# Patient Record
Sex: MALE | Race: WHITE | NOT HISPANIC OR LATINO | Employment: FULL TIME | URBAN - METROPOLITAN AREA
[De-identification: names, ages, dates, MRNs, and addresses within clinical notes are randomized per-mention and may not be internally consistent; named-entity substitution may affect disease eponyms.]

---

## 2018-10-11 ENCOUNTER — HOSPITAL ENCOUNTER (OUTPATIENT)
Dept: RADIOLOGY | Facility: MEDICAL CENTER | Age: 28
End: 2018-10-11
Attending: COLON & RECTAL SURGERY
Payer: COMMERCIAL

## 2018-10-11 DIAGNOSIS — Z01.818 OTHER SPECIFIED PRE-OPERATIVE EXAMINATION: ICD-10-CM

## 2018-10-11 DIAGNOSIS — E66.01 MORBID OBESITY (HCC): ICD-10-CM

## 2018-10-11 PROCEDURE — 700112 HCHG RX REV CODE 229: Performed by: COLON & RECTAL SURGERY

## 2018-10-11 PROCEDURE — A9270 NON-COVERED ITEM OR SERVICE: HCPCS | Performed by: COLON & RECTAL SURGERY

## 2018-10-11 PROCEDURE — 74241 DX-UPPER GI-SERIES WITH KUB: CPT

## 2018-10-11 RX ADMIN — ANTACID/ANTIFLATULENT 1 PACKET: 380; 550; 10; 10 GRANULE, EFFERVESCENT ORAL at 12:24

## 2018-11-12 ENCOUNTER — HOSPITAL ENCOUNTER (OUTPATIENT)
Dept: RADIOLOGY | Facility: MEDICAL CENTER | Age: 28
End: 2018-11-12
Attending: COLON & RECTAL SURGERY | Admitting: COLON & RECTAL SURGERY
Payer: COMMERCIAL

## 2018-11-12 DIAGNOSIS — Z01.812 PRE-OPERATIVE LABORATORY EXAMINATION: ICD-10-CM

## 2018-11-12 DIAGNOSIS — Z01.811 PRE-OPERATIVE RESPIRATORY EXAMINATION: ICD-10-CM

## 2018-11-12 DIAGNOSIS — Z01.810 PRE-OPERATIVE CARDIOVASCULAR EXAMINATION: ICD-10-CM

## 2018-11-12 LAB
ALBUMIN SERPL BCP-MCNC: 4.8 G/DL (ref 3.2–4.9)
ALBUMIN/GLOB SERPL: 1.5 G/DL
ALP SERPL-CCNC: 83 U/L (ref 30–99)
ALT SERPL-CCNC: 36 U/L (ref 2–50)
ANION GAP SERPL CALC-SCNC: 13 MMOL/L (ref 0–11.9)
AST SERPL-CCNC: 22 U/L (ref 12–45)
BASOPHILS # BLD AUTO: 0.4 % (ref 0–1.8)
BASOPHILS # BLD: 0.03 K/UL (ref 0–0.12)
BILIRUB SERPL-MCNC: 0.6 MG/DL (ref 0.1–1.5)
BUN SERPL-MCNC: 9 MG/DL (ref 8–22)
CALCIUM SERPL-MCNC: 10.5 MG/DL (ref 8.5–10.5)
CHLORIDE SERPL-SCNC: 102 MMOL/L (ref 96–112)
CO2 SERPL-SCNC: 25 MMOL/L (ref 20–33)
CREAT SERPL-MCNC: 0.75 MG/DL (ref 0.5–1.4)
EKG IMPRESSION: NORMAL
EOSINOPHIL # BLD AUTO: 0.12 K/UL (ref 0–0.51)
EOSINOPHIL NFR BLD: 1.5 % (ref 0–6.9)
ERYTHROCYTE [DISTWIDTH] IN BLOOD BY AUTOMATED COUNT: 41.5 FL (ref 35.9–50)
EST. AVERAGE GLUCOSE BLD GHB EST-MCNC: 140 MG/DL
GLOBULIN SER CALC-MCNC: 3.2 G/DL (ref 1.9–3.5)
GLUCOSE SERPL-MCNC: 95 MG/DL (ref 65–99)
HBA1C MFR BLD: 6.5 % (ref 0–5.6)
HCT VFR BLD AUTO: 44.8 % (ref 42–52)
HGB BLD-MCNC: 14.6 G/DL (ref 14–18)
IMM GRANULOCYTES # BLD AUTO: 0.08 K/UL (ref 0–0.11)
IMM GRANULOCYTES NFR BLD AUTO: 1 % (ref 0–0.9)
INR PPP: 1.02 (ref 0.87–1.13)
LYMPHOCYTES # BLD AUTO: 2.14 K/UL (ref 1–4.8)
LYMPHOCYTES NFR BLD: 26.1 % (ref 22–41)
MCH RBC QN AUTO: 26 PG (ref 27–33)
MCHC RBC AUTO-ENTMCNC: 32.6 G/DL (ref 33.7–35.3)
MCV RBC AUTO: 79.9 FL (ref 81.4–97.8)
MONOCYTES # BLD AUTO: 0.5 K/UL (ref 0–0.85)
MONOCYTES NFR BLD AUTO: 6.1 % (ref 0–13.4)
NEUTROPHILS # BLD AUTO: 5.34 K/UL (ref 1.82–7.42)
NEUTROPHILS NFR BLD: 64.9 % (ref 44–72)
NRBC # BLD AUTO: 0 K/UL
NRBC BLD-RTO: 0 /100 WBC
PLATELET # BLD AUTO: 230 K/UL (ref 164–446)
PMV BLD AUTO: 12 FL (ref 9–12.9)
POTASSIUM SERPL-SCNC: 3.9 MMOL/L (ref 3.6–5.5)
PROT SERPL-MCNC: 8 G/DL (ref 6–8.2)
PROTHROMBIN TIME: 13.5 SEC (ref 12–14.6)
RBC # BLD AUTO: 5.61 M/UL (ref 4.7–6.1)
SODIUM SERPL-SCNC: 140 MMOL/L (ref 135–145)
WBC # BLD AUTO: 8.2 K/UL (ref 4.8–10.8)

## 2018-11-12 PROCEDURE — 85610 PROTHROMBIN TIME: CPT

## 2018-11-12 PROCEDURE — 71045 X-RAY EXAM CHEST 1 VIEW: CPT

## 2018-11-12 PROCEDURE — 93010 ELECTROCARDIOGRAM REPORT: CPT | Performed by: INTERNAL MEDICINE

## 2018-11-12 PROCEDURE — 36415 COLL VENOUS BLD VENIPUNCTURE: CPT

## 2018-11-12 PROCEDURE — 83036 HEMOGLOBIN GLYCOSYLATED A1C: CPT

## 2018-11-12 PROCEDURE — 85025 COMPLETE CBC W/AUTO DIFF WBC: CPT

## 2018-11-12 PROCEDURE — 93005 ELECTROCARDIOGRAM TRACING: CPT

## 2018-11-12 PROCEDURE — 80053 COMPREHEN METABOLIC PANEL: CPT

## 2018-11-12 RX ORDER — GLYBURIDE 5 MG/1
10 TABLET ORAL 2 TIMES DAILY
COMMUNITY

## 2018-11-12 RX ORDER — AMLODIPINE BESYLATE 2.5 MG/1
2.5 TABLET ORAL EVERY EVENING
COMMUNITY

## 2018-11-12 RX ORDER — SERTRALINE HYDROCHLORIDE 100 MG/1
200 TABLET, FILM COATED ORAL
COMMUNITY

## 2018-11-19 RX ORDER — ACETAMINOPHEN 10 MG/ML
1 INJECTION, SOLUTION INTRAVENOUS
Status: COMPLETED | OUTPATIENT
Start: 2018-11-21 | End: 2018-11-21

## 2018-11-21 ENCOUNTER — HOSPITAL ENCOUNTER (OUTPATIENT)
Facility: MEDICAL CENTER | Age: 28
End: 2018-11-22
Attending: COLON & RECTAL SURGERY | Admitting: COLON & RECTAL SURGERY
Payer: COMMERCIAL

## 2018-11-21 LAB
GLUCOSE BLD-MCNC: 114 MG/DL (ref 65–99)
PATHOLOGY CONSULT NOTE: NORMAL

## 2018-11-21 PROCEDURE — 700111 HCHG RX REV CODE 636 W/ 250 OVERRIDE (IP): Performed by: COLON & RECTAL SURGERY

## 2018-11-21 PROCEDURE — 700102 HCHG RX REV CODE 250 W/ 637 OVERRIDE(OP): Performed by: PHYSICIAN ASSISTANT

## 2018-11-21 PROCEDURE — A9270 NON-COVERED ITEM OR SERVICE: HCPCS | Performed by: PHYSICIAN ASSISTANT

## 2018-11-21 PROCEDURE — 502570 HCHG PACK, GASTRIC BANDING: Performed by: COLON & RECTAL SURGERY

## 2018-11-21 PROCEDURE — 700101 HCHG RX REV CODE 250

## 2018-11-21 PROCEDURE — 160035 HCHG PACU - 1ST 60 MINS PHASE I: Performed by: COLON & RECTAL SURGERY

## 2018-11-21 PROCEDURE — 160036 HCHG PACU - EA ADDL 30 MINS PHASE I: Performed by: COLON & RECTAL SURGERY

## 2018-11-21 PROCEDURE — G0378 HOSPITAL OBSERVATION PER HR: HCPCS

## 2018-11-21 PROCEDURE — 700111 HCHG RX REV CODE 636 W/ 250 OVERRIDE (IP): Performed by: PHYSICIAN ASSISTANT

## 2018-11-21 PROCEDURE — 160041 HCHG SURGERY MINUTES - EA ADDL 1 MIN LEVEL 4: Performed by: COLON & RECTAL SURGERY

## 2018-11-21 PROCEDURE — 501583 HCHG TROCAR, THRD CAN&SEAL 5X100: Performed by: COLON & RECTAL SURGERY

## 2018-11-21 PROCEDURE — 82962 GLUCOSE BLOOD TEST: CPT

## 2018-11-21 PROCEDURE — 500448 HCHG DRESSING, TELFA 3X4: Performed by: COLON & RECTAL SURGERY

## 2018-11-21 PROCEDURE — 160002 HCHG RECOVERY MINUTES (STAT): Performed by: COLON & RECTAL SURGERY

## 2018-11-21 PROCEDURE — 88305 TISSUE EXAM BY PATHOLOGIST: CPT

## 2018-11-21 PROCEDURE — A9270 NON-COVERED ITEM OR SERVICE: HCPCS | Performed by: COLON & RECTAL SURGERY

## 2018-11-21 PROCEDURE — 501399 HCHG SPECIMAN BAG, ENDO CATC: Performed by: COLON & RECTAL SURGERY

## 2018-11-21 PROCEDURE — 501338 HCHG SHEARS, ENDO: Performed by: COLON & RECTAL SURGERY

## 2018-11-21 PROCEDURE — 700102 HCHG RX REV CODE 250 W/ 637 OVERRIDE(OP): Performed by: COLON & RECTAL SURGERY

## 2018-11-21 PROCEDURE — 501497 HCHG SURGICLIP: Performed by: COLON & RECTAL SURGERY

## 2018-11-21 PROCEDURE — 500800 HCHG LAPAROSCOPIC J/L HOOK: Performed by: COLON & RECTAL SURGERY

## 2018-11-21 PROCEDURE — 88307 TISSUE EXAM BY PATHOLOGIST: CPT

## 2018-11-21 PROCEDURE — 160029 HCHG SURGERY MINUTES - 1ST 30 MINS LEVEL 4: Performed by: COLON & RECTAL SURGERY

## 2018-11-21 PROCEDURE — 503366 HCHG TROCAR, 12X150 KII FIOS Z THR: Performed by: COLON & RECTAL SURGERY

## 2018-11-21 PROCEDURE — 160009 HCHG ANES TIME/MIN: Performed by: COLON & RECTAL SURGERY

## 2018-11-21 PROCEDURE — 501838 HCHG SUTURE GENERAL: Performed by: COLON & RECTAL SURGERY

## 2018-11-21 PROCEDURE — 88313 SPECIAL STAINS GROUP 2: CPT | Mod: 91

## 2018-11-21 PROCEDURE — 501570 HCHG TROCAR, SEPARATOR: Performed by: COLON & RECTAL SURGERY

## 2018-11-21 PROCEDURE — 700111 HCHG RX REV CODE 636 W/ 250 OVERRIDE (IP)

## 2018-11-21 PROCEDURE — 700111 HCHG RX REV CODE 636 W/ 250 OVERRIDE (IP): Performed by: ANESTHESIOLOGY

## 2018-11-21 PROCEDURE — 96375 TX/PRO/DX INJ NEW DRUG ADDON: CPT

## 2018-11-21 PROCEDURE — 160048 HCHG OR STATISTICAL LEVEL 1-5: Performed by: COLON & RECTAL SURGERY

## 2018-11-21 RX ORDER — SODIUM CHLORIDE, SODIUM LACTATE, POTASSIUM CHLORIDE, AND CALCIUM CHLORIDE .6; .31; .03; .02 G/100ML; G/100ML; G/100ML; G/100ML
500 INJECTION, SOLUTION INTRAVENOUS
Status: DISCONTINUED | OUTPATIENT
Start: 2018-11-21 | End: 2018-11-22 | Stop reason: HOSPADM

## 2018-11-21 RX ORDER — HYDROMORPHONE HYDROCHLORIDE 1 MG/ML
0.5 INJECTION, SOLUTION INTRAMUSCULAR; INTRAVENOUS; SUBCUTANEOUS
Status: DISCONTINUED | OUTPATIENT
Start: 2018-11-21 | End: 2018-11-22 | Stop reason: HOSPADM

## 2018-11-21 RX ORDER — BUPIVACAINE HYDROCHLORIDE AND EPINEPHRINE 5; 5 MG/ML; UG/ML
INJECTION, SOLUTION PERINEURAL
Status: DISCONTINUED | OUTPATIENT
Start: 2018-11-21 | End: 2018-11-21 | Stop reason: HOSPADM

## 2018-11-21 RX ORDER — DIPHENHYDRAMINE HYDROCHLORIDE 50 MG/ML
12.5 INJECTION INTRAMUSCULAR; INTRAVENOUS EVERY 6 HOURS PRN
Status: DISCONTINUED | OUTPATIENT
Start: 2018-11-21 | End: 2018-11-22 | Stop reason: HOSPADM

## 2018-11-21 RX ORDER — ONDANSETRON 2 MG/ML
4 INJECTION INTRAMUSCULAR; INTRAVENOUS
Status: COMPLETED | OUTPATIENT
Start: 2018-11-21 | End: 2018-11-21

## 2018-11-21 RX ORDER — GABAPENTIN 300 MG/1
300 CAPSULE ORAL 3 TIMES DAILY
Status: DISCONTINUED | OUTPATIENT
Start: 2018-11-21 | End: 2018-11-22 | Stop reason: HOSPADM

## 2018-11-21 RX ORDER — ACETAMINOPHEN 500 MG
1000 TABLET ORAL EVERY 6 HOURS
Status: DISCONTINUED | OUTPATIENT
Start: 2018-11-22 | End: 2018-11-22 | Stop reason: HOSPADM

## 2018-11-21 RX ORDER — ONDANSETRON 2 MG/ML
4 INJECTION INTRAMUSCULAR; INTRAVENOUS EVERY 4 HOURS PRN
Status: DISCONTINUED | OUTPATIENT
Start: 2018-11-21 | End: 2018-11-22 | Stop reason: HOSPADM

## 2018-11-21 RX ORDER — LABETALOL HYDROCHLORIDE 5 MG/ML
5 INJECTION, SOLUTION INTRAVENOUS
Status: DISCONTINUED | OUTPATIENT
Start: 2018-11-21 | End: 2018-11-21 | Stop reason: HOSPADM

## 2018-11-21 RX ORDER — SCOLOPAMINE TRANSDERMAL SYSTEM 1 MG/1
1 PATCH, EXTENDED RELEASE TRANSDERMAL
COMMUNITY
End: 2022-03-23

## 2018-11-21 RX ORDER — SODIUM CHLORIDE, SODIUM LACTATE, POTASSIUM CHLORIDE, CALCIUM CHLORIDE 600; 310; 30; 20 MG/100ML; MG/100ML; MG/100ML; MG/100ML
INJECTION, SOLUTION INTRAVENOUS CONTINUOUS
Status: DISCONTINUED | OUTPATIENT
Start: 2018-11-21 | End: 2018-11-21 | Stop reason: HOSPADM

## 2018-11-21 RX ORDER — GABAPENTIN 300 MG/1
300 CAPSULE ORAL
Status: COMPLETED | OUTPATIENT
Start: 2018-11-21 | End: 2018-11-21

## 2018-11-21 RX ORDER — DEXTROSE MONOHYDRATE 25 G/50ML
50 INJECTION, SOLUTION INTRAVENOUS PRN
Status: DISCONTINUED | OUTPATIENT
Start: 2018-11-21 | End: 2018-11-22 | Stop reason: HOSPADM

## 2018-11-21 RX ORDER — HALOPERIDOL 5 MG/ML
1 INJECTION INTRAMUSCULAR EVERY 6 HOURS PRN
Status: DISCONTINUED | OUTPATIENT
Start: 2018-11-21 | End: 2018-11-22 | Stop reason: HOSPADM

## 2018-11-21 RX ORDER — LIDOCAINE HYDROCHLORIDE 10 MG/ML
INJECTION, SOLUTION INFILTRATION; PERINEURAL
Status: COMPLETED
Start: 2018-11-21 | End: 2018-11-21

## 2018-11-21 RX ORDER — ENALAPRILAT 1.25 MG/ML
2.5 INJECTION INTRAVENOUS EVERY 6 HOURS PRN
Status: DISCONTINUED | OUTPATIENT
Start: 2018-11-21 | End: 2018-11-22 | Stop reason: HOSPADM

## 2018-11-21 RX ORDER — PROMETHAZINE HYDROCHLORIDE 25 MG/1
25 SUPPOSITORY RECTAL EVERY 4 HOURS PRN
Status: DISCONTINUED | OUTPATIENT
Start: 2018-11-21 | End: 2018-11-22 | Stop reason: HOSPADM

## 2018-11-21 RX ORDER — SODIUM CHLORIDE 9 MG/ML
INJECTION, SOLUTION INTRAVENOUS ONCE
Status: COMPLETED | OUTPATIENT
Start: 2018-11-21 | End: 2018-11-21

## 2018-11-21 RX ORDER — ACETAMINOPHEN 10 MG/ML
1000 INJECTION, SOLUTION INTRAVENOUS EVERY 6 HOURS
Status: DISPENSED | OUTPATIENT
Start: 2018-11-21 | End: 2018-11-22

## 2018-11-21 RX ORDER — OXYCODONE HCL 5 MG/5 ML
10 SOLUTION, ORAL ORAL
Status: DISCONTINUED | OUTPATIENT
Start: 2018-11-21 | End: 2018-11-22 | Stop reason: HOSPADM

## 2018-11-21 RX ORDER — AMLODIPINE BESYLATE 5 MG/1
2.5 TABLET ORAL EVERY MORNING
Status: DISCONTINUED | OUTPATIENT
Start: 2018-11-21 | End: 2018-11-22 | Stop reason: HOSPADM

## 2018-11-21 RX ORDER — MEPERIDINE HYDROCHLORIDE 25 MG/ML
25 INJECTION INTRAMUSCULAR; INTRAVENOUS; SUBCUTANEOUS
Status: DISCONTINUED | OUTPATIENT
Start: 2018-11-21 | End: 2018-11-21 | Stop reason: HOSPADM

## 2018-11-21 RX ORDER — HYDROXYZINE HYDROCHLORIDE 25 MG/1
25 TABLET, FILM COATED ORAL EVERY 6 HOURS PRN
Status: DISCONTINUED | OUTPATIENT
Start: 2018-11-21 | End: 2018-11-22 | Stop reason: HOSPADM

## 2018-11-21 RX ORDER — SERTRALINE HYDROCHLORIDE 100 MG/1
200 TABLET, FILM COATED ORAL
Status: DISCONTINUED | OUTPATIENT
Start: 2018-11-21 | End: 2018-11-22 | Stop reason: HOSPADM

## 2018-11-21 RX ORDER — SIMETHICONE 80 MG
80 TABLET,CHEWABLE ORAL 3 TIMES DAILY PRN
Status: DISCONTINUED | OUTPATIENT
Start: 2018-11-21 | End: 2018-11-22 | Stop reason: HOSPADM

## 2018-11-21 RX ORDER — MIDAZOLAM HYDROCHLORIDE 1 MG/ML
1 INJECTION INTRAMUSCULAR; INTRAVENOUS
Status: DISCONTINUED | OUTPATIENT
Start: 2018-11-21 | End: 2018-11-21 | Stop reason: HOSPADM

## 2018-11-21 RX ORDER — CALCIUM CARBONATE 500 MG/1
500 TABLET, CHEWABLE ORAL
Status: DISCONTINUED | OUTPATIENT
Start: 2018-11-21 | End: 2018-11-22 | Stop reason: HOSPADM

## 2018-11-21 RX ORDER — OXYCODONE HCL 10 MG/1
10 TABLET, FILM COATED, EXTENDED RELEASE ORAL
Status: COMPLETED | OUTPATIENT
Start: 2018-11-21 | End: 2018-11-21

## 2018-11-21 RX ADMIN — MIDAZOLAM 1 MG: 1 INJECTION INTRAMUSCULAR; INTRAVENOUS at 13:17

## 2018-11-21 RX ADMIN — OXYCODONE HYDROCHLORIDE 10 MG: 10 TABLET, FILM COATED, EXTENDED RELEASE ORAL at 10:34

## 2018-11-21 RX ADMIN — OXYCODONE HYDROCHLORIDE 10 MG: 5 SOLUTION ORAL at 15:28

## 2018-11-21 RX ADMIN — MIDAZOLAM 1 MG: 1 INJECTION INTRAMUSCULAR; INTRAVENOUS at 13:12

## 2018-11-21 RX ADMIN — OXYCODONE HYDROCHLORIDE 10 MG: 5 SOLUTION ORAL at 19:32

## 2018-11-21 RX ADMIN — SODIUM CHLORIDE: 9 INJECTION, SOLUTION INTRAVENOUS at 10:35

## 2018-11-21 RX ADMIN — GABAPENTIN 300 MG: 300 CAPSULE ORAL at 10:34

## 2018-11-21 RX ADMIN — ONDANSETRON 4 MG: 2 INJECTION INTRAMUSCULAR; INTRAVENOUS at 12:56

## 2018-11-21 RX ADMIN — GABAPENTIN 300 MG: 300 CAPSULE ORAL at 18:07

## 2018-11-21 RX ADMIN — ENALAPRILAT 1.25 MG: 1.25 INJECTION INTRAVENOUS at 15:29

## 2018-11-21 RX ADMIN — ACETAMINOPHEN 1 G: 10 INJECTION, SOLUTION INTRAVENOUS at 10:38

## 2018-11-21 RX ADMIN — FAMOTIDINE 20 MG: 10 INJECTION INTRAVENOUS at 18:07

## 2018-11-21 RX ADMIN — LIDOCAINE HYDROCHLORIDE 2 ML: 10 INJECTION, SOLUTION INFILTRATION; PERINEURAL at 10:35

## 2018-11-21 RX ADMIN — MEPERIDINE HYDROCHLORIDE 25 MG: 25 INJECTION INTRAMUSCULAR; INTRAVENOUS; SUBCUTANEOUS at 12:56

## 2018-11-21 ASSESSMENT — PATIENT HEALTH QUESTIONNAIRE - PHQ9
2. FEELING DOWN, DEPRESSED, IRRITABLE, OR HOPELESS: NOT AT ALL
1. LITTLE INTEREST OR PLEASURE IN DOING THINGS: NOT AT ALL
SUM OF ALL RESPONSES TO PHQ9 QUESTIONS 1 AND 2: 0

## 2018-11-21 ASSESSMENT — COGNITIVE AND FUNCTIONAL STATUS - GENERAL
DAILY ACTIVITIY SCORE: 24
SUGGESTED CMS G CODE MODIFIER MOBILITY: CJ
TURNING FROM BACK TO SIDE WHILE IN FLAT BAD: A LITTLE
MOBILITY SCORE: 22
SUGGESTED CMS G CODE MODIFIER DAILY ACTIVITY: CH
CLIMB 3 TO 5 STEPS WITH RAILING: A LITTLE

## 2018-11-21 ASSESSMENT — PAIN SCALES - GENERAL
PAINLEVEL_OUTOF10: 0
PAINLEVEL_OUTOF10: 0
PAINLEVEL_OUTOF10: 3
PAINLEVEL_OUTOF10: 0
PAINLEVEL_OUTOF10: 3
PAINLEVEL_OUTOF10: 0
PAINLEVEL_OUTOF10: 4
PAINLEVEL_OUTOF10: 6
PAINLEVEL_OUTOF10: 2
PAINLEVEL_OUTOF10: 4
PAINLEVEL_OUTOF10: 4

## 2018-11-21 ASSESSMENT — LIFESTYLE VARIABLES
ALCOHOL_USE: NO
EVER_SMOKED: NEVER

## 2018-11-21 NOTE — PROGRESS NOTES
Received pt from PACU. Parent at [facility]. Pt AO x4. No complaints of nausea, tolerable pain at incision site. Pain 4/10, ice applied, SCD on.

## 2018-11-21 NOTE — OP REPORT
NAME:  Ga Mora  MRN:  [id number]  :  1990      DATE OF OPERATION: 2018    PREOPERATIVE DIAGNOSIS: Morbid Obesity with medical sequelae; Congested Fatty Liver Disease    POSTOPERATIVE DIAGNOSIS: Morbid Obesity with medical sequelae; Congested Fatty Liver Disease    OPERATION PERFORMED: 1.  Laparoscopic Sleeve Gastrectomy  2.  Left Lobe Liver Biopsy     SURGEON: Mahin Hull MD    ASSISTANT:  Krzysztof Mancini PA-C    ANESTHESIOLOGIST:  Anesthesiologist: Jonathan Yao M.D.    ANESTHESIA: General endotracheal anesthesia.     SPECIMEN: Stomach; Liver Biopsy    ESTIMATED BLOOD LOSS: <10cc.     INDICATIONS: The patient is a 28 y.o. male with a diagnosis of morbid obesity with medical sequelae. He is taken to the operating room today for Laparoscopic Sleeve Gastrectomy and liver biopsy.     PROCEDURE: Following informed consent, the patient was properly identified, taken to the operating room, and placed in the supine position where general endotracheal anesthesia was administered. Intravenous antibiotics were administered by the anesthesiologist in the correct time interval. Sequential compression devices were employed. The abdomen was prepped and draped into a sterile field.     An optical entry bladeless  trocar was utilized and pneumoperitoneum carefully established in the usual fashion.  The bladeless 5 mm separator trocar was introduced and the 5 mm lens/camera was passed into the peritoneal cavity.  Three additional separator trocars were placed under direct vision.  A 5 mm Tiffany-type liver retractor was placed into position.  This was used to elevate the left sided segment of the liver.  It was secured to the patients right side with a robot arm.  Careful inspection revealed no untoward events with placement of the trocars.    The gastrocolic omentum was examined and dissected with the ligasure device and a point on the distal antrum was selected to begin the sleeve gastrectomy.  A 40 Mongolian  bougie was then passed down into the antrum.  A careful inspection at the hiatus demonstrated no significant hiatal hernia which would require repair or risk significant reflux. A long 12mm bladeless trocar was introduced. A TVPage linear stapler with a thick-tissue cartridges, was employed to divide partway across the stomach.  With the bougie in position, the stapler was then used to march proximally along the stomach and transsection of the stomach was performed, beginning 5 cm proximal to the pylorus in the method of the sleeve gastrectomy.  The greater curvature aspect of the stomach was then dissected and the greater curvature vessels and short gastric vessels were divided with the ligasure.      The last endomechanical stapler firings were used to complete the transection of the stomach.  Hemoclips were used if any site exhibited oozing. Careful inspection of the staple line demonstrated excellent, meticulous hemostasis and a completely intact staple line with seemless tissue approximation.  Seromuscular sutures were placed using polysorb suture to further secure the staple line.  The liver exhibited hepatic steatosis.  A Trucut liver biopsy was obtained from the left lobe of the liver and sent for pathology.  Hemostasis on the liver was achieved with cautery. The bougie was then removed.     The large endocatch bag was then used to retrieve the stomach specimen.  Tisseal fibrin glue sealant was sprayed along the entire staple line. The ports were removed under direct vision and the pneumoperitoneum was allowed to escape. The fascia of this port was closed with 0-Vicryl suture.  The port sites were then irrigated well.  The port site skin incisions were closed with interrupted 4-0 Vicryl subcuticular sutures.  Steri-Strips and Benzoin were applied beneath sterile Band-Aids.     The patient tolerated the procedure well and there were no apparent complications. All sponge, needle, and instrument counts were  correct on 2 separate occasions. He was awakened, extubated, and transferred to the recovery room in satisfactory condition.       ____________________________________   Mahin Hull MD  DD: 11/21/2018  12:34 PM    CC:  [facility];

## 2018-11-21 NOTE — PROGRESS NOTES
Med rec updated and complete  Allergies reviewed  Interviewed pt with mother at bedside with permission from pt.  Pt reports no vitamins or OTC's.  Pt reports no antibiotics in the last 30 days.

## 2018-11-21 NOTE — OR NURSING
VSS, pt AOx4, states pain as tolerable, no nausea. Surgical dressings x5 with scant drainage. On 2L O2Sat 95%. Report called, family updated.

## 2018-11-21 NOTE — OR NURSING
Pt to PACU, resting with eyes closed, VSS, on 4LNC, O2Sat 95%. Dressing intact,ice pack to the site, no apparent distress.

## 2018-11-22 VITALS
SYSTOLIC BLOOD PRESSURE: 143 MMHG | DIASTOLIC BLOOD PRESSURE: 76 MMHG | HEART RATE: 82 BPM | HEIGHT: 72 IN | TEMPERATURE: 98.5 F | OXYGEN SATURATION: 90 % | BODY MASS INDEX: 42.66 KG/M2 | WEIGHT: 315 LBS | RESPIRATION RATE: 16 BRPM

## 2018-11-22 LAB
ALBUMIN SERPL BCP-MCNC: 4.4 G/DL (ref 3.2–4.9)
ALBUMIN/GLOB SERPL: 1.8 G/DL
ALP SERPL-CCNC: 70 U/L (ref 30–99)
ALT SERPL-CCNC: 45 U/L (ref 2–50)
ANION GAP SERPL CALC-SCNC: 11 MMOL/L (ref 0–11.9)
AST SERPL-CCNC: 22 U/L (ref 12–45)
BILIRUB SERPL-MCNC: 0.4 MG/DL (ref 0.1–1.5)
BUN SERPL-MCNC: 6 MG/DL (ref 8–22)
CALCIUM SERPL-MCNC: 9.5 MG/DL (ref 8.5–10.5)
CHLORIDE SERPL-SCNC: 103 MMOL/L (ref 96–112)
CO2 SERPL-SCNC: 22 MMOL/L (ref 20–33)
CREAT SERPL-MCNC: 0.58 MG/DL (ref 0.5–1.4)
ERYTHROCYTE [DISTWIDTH] IN BLOOD BY AUTOMATED COUNT: 42.5 FL (ref 35.9–50)
GLOBULIN SER CALC-MCNC: 2.5 G/DL (ref 1.9–3.5)
GLUCOSE SERPL-MCNC: 118 MG/DL (ref 65–99)
HCT VFR BLD AUTO: 40.4 % (ref 42–52)
HGB BLD-MCNC: 13.1 G/DL (ref 14–18)
MCH RBC QN AUTO: 26 PG (ref 27–33)
MCHC RBC AUTO-ENTMCNC: 32.4 G/DL (ref 33.7–35.3)
MCV RBC AUTO: 80.3 FL (ref 81.4–97.8)
PLATELET # BLD AUTO: 237 K/UL (ref 164–446)
PMV BLD AUTO: 12.6 FL (ref 9–12.9)
POTASSIUM SERPL-SCNC: 3.8 MMOL/L (ref 3.6–5.5)
PROT SERPL-MCNC: 6.9 G/DL (ref 6–8.2)
RBC # BLD AUTO: 5.03 M/UL (ref 4.7–6.1)
SODIUM SERPL-SCNC: 136 MMOL/L (ref 135–145)
WBC # BLD AUTO: 12.3 K/UL (ref 4.8–10.8)

## 2018-11-22 PROCEDURE — G0378 HOSPITAL OBSERVATION PER HR: HCPCS

## 2018-11-22 PROCEDURE — 85027 COMPLETE CBC AUTOMATED: CPT

## 2018-11-22 PROCEDURE — 700102 HCHG RX REV CODE 250 W/ 637 OVERRIDE(OP): Performed by: PHYSICIAN ASSISTANT

## 2018-11-22 PROCEDURE — 90686 IIV4 VACC NO PRSV 0.5 ML IM: CPT | Performed by: COLON & RECTAL SURGERY

## 2018-11-22 PROCEDURE — 90471 IMMUNIZATION ADMIN: CPT

## 2018-11-22 PROCEDURE — 700111 HCHG RX REV CODE 636 W/ 250 OVERRIDE (IP): Performed by: PHYSICIAN ASSISTANT

## 2018-11-22 PROCEDURE — 80053 COMPREHEN METABOLIC PANEL: CPT

## 2018-11-22 PROCEDURE — 96372 THER/PROPH/DIAG INJ SC/IM: CPT

## 2018-11-22 PROCEDURE — 96374 THER/PROPH/DIAG INJ IV PUSH: CPT

## 2018-11-22 PROCEDURE — 36415 COLL VENOUS BLD VENIPUNCTURE: CPT

## 2018-11-22 PROCEDURE — A9270 NON-COVERED ITEM OR SERVICE: HCPCS | Performed by: PHYSICIAN ASSISTANT

## 2018-11-22 PROCEDURE — 96375 TX/PRO/DX INJ NEW DRUG ADDON: CPT

## 2018-11-22 PROCEDURE — 700111 HCHG RX REV CODE 636 W/ 250 OVERRIDE (IP): Performed by: COLON & RECTAL SURGERY

## 2018-11-22 RX ADMIN — ACETAMINOPHEN 1000 MG: 500 TABLET ORAL at 11:07

## 2018-11-22 RX ADMIN — OXYCODONE HYDROCHLORIDE 10 MG: 5 SOLUTION ORAL at 00:30

## 2018-11-22 RX ADMIN — AMLODIPINE BESYLATE 2.5 MG: 2.5 TABLET ORAL at 05:25

## 2018-11-22 RX ADMIN — INFLUENZA A VIRUS A/MICHIGAN/45/2015 X-275 (H1N1) ANTIGEN (FORMALDEHYDE INACTIVATED), INFLUENZA A VIRUS A/SINGAPORE/INFIMH-16-0019/2016 IVR-186 (H3N2) ANTIGEN (FORMALDEHYDE INACTIVATED), INFLUENZA B VIRUS B/PHUKET/3073/2013 ANTIGEN (FORMALDEHYDE INACTIVATED), AND INFLUENZA B VIRUS B/MARYLAND/15/2016 BX-69A ANTIGEN (FORMALDEHYDE INACTIVATED) 0.5 ML: 15; 15; 15; 15 INJECTION, SUSPENSION INTRAMUSCULAR at 11:08

## 2018-11-22 RX ADMIN — ACETAMINOPHEN 1000 MG: 10 INJECTION, SOLUTION INTRAVENOUS at 00:29

## 2018-11-22 RX ADMIN — ENOXAPARIN SODIUM 40 MG: 100 INJECTION SUBCUTANEOUS at 11:08

## 2018-11-22 RX ADMIN — OXYCODONE HYDROCHLORIDE 10 MG: 5 SOLUTION ORAL at 11:07

## 2018-11-22 RX ADMIN — GABAPENTIN 300 MG: 300 CAPSULE ORAL at 11:07

## 2018-11-22 RX ADMIN — ENOXAPARIN SODIUM 40 MG: 100 INJECTION SUBCUTANEOUS at 00:29

## 2018-11-22 RX ADMIN — OXYCODONE HYDROCHLORIDE 10 MG: 5 SOLUTION ORAL at 05:25

## 2018-11-22 RX ADMIN — FAMOTIDINE 20 MG: 10 INJECTION INTRAVENOUS at 05:26

## 2018-11-22 RX ADMIN — GABAPENTIN 300 MG: 300 CAPSULE ORAL at 05:25

## 2018-11-22 RX ADMIN — ACETAMINOPHEN 1000 MG: 500 TABLET ORAL at 05:26

## 2018-11-22 ASSESSMENT — ENCOUNTER SYMPTOMS
NAUSEA: 0
COUGH: 0
FEVER: 0
DIARRHEA: 0
CHILLS: 0
ABDOMINAL PAIN: 1
CONSTIPATION: 0
VOMITING: 0
SHORTNESS OF BREATH: 0
HEARTBURN: 0

## 2018-11-22 ASSESSMENT — PAIN SCALES - GENERAL
PAINLEVEL_OUTOF10: 3
PAINLEVEL_OUTOF10: 3
PAINLEVEL_OUTOF10: 2

## 2018-11-22 NOTE — DISCHARGE INSTRUCTIONS
Discharge Instructions  Bariatric D/C instructions:     1. DIET: Follow the diet progression detailed in your post-op booklet.  Progressing as instructed will make for a smooth transition after surgery and prevent any pain associated with eating foods before your stomach is ready or eating too much.  Water and hydration is much more important than food intake the first week or two after surgery.  Drink enough water to keep your urine pale yellow.  Increase water intake if your urine is a darker yellow or if have burning with urination.  Nausea and vomiting once or twice after you leave the hospital is normal.  Sip fluids continually and stay hydrated.     2.  SUPPLEMENTS: Start your supplements 1 week after surgery.  You may need to cut some supplements in half initially.  Follow the guidelines from your supplement handout from your pre-op class.     3. ACTIVITIES: After discharge from the hospital, you may resume full routine activities. However, there should be no heavy lifting (greater than 15 pounds) and no strenuous activities until after your follow-up visit. Otherwise, routine activities of daily living are acceptable.  More movement and walking after surgery the better.     4. DRIVING: You may drive whenever you are off pain medications and are able to perform the activities needed to drive, i.e. turning, bending, twisting, etc.     5. BATHING AND WOUND CARE: You may get the wound wet 2 days after surgery. You may shower, but do not submerge in a bath for at least a week. Dressings may come off after 48 hours. Please leave the steri-strips in place, they will fall off over 5-7 days. You may notice some clear or slightly bloody drainage from your wounds and there may be some mild redness or bruising around your incision sites.  This is normal.     6. BOWEL FUNCTION: Constipation is common after an operation, especially with pain medications. The combination of pain medication and decreased activity level can  cause constipation in otherwise normal patients. If you feel this is occurring, take a laxative (Milk of Magnesia, Miralax, etc.) until the problem has resolved.  Diarrhea the first few days after surgery can also be normal.  You may notice that it is dark in color or see blood, which is also normal and should subside in the first week post-op.     7. PAIN MEDICATION: You have been given a prescription for pain medication preoperatively.  Please take these as directed. It is important to remember not to take medications on an empty stomach as this may cause nausea.  For minimal discomfort you may use liquid Tylenol 650 mg every 4 hours.  DO NOT exceed 4,000 mg of Tylenol in 24 hours.  DO NOT use non steroidal anti-inflamatory medication such as: Asprin, Ibuprofen, Advil, Motrin, Aleve, or steroids.  These medication can cause ulcers after weight loss surgery.     8.CALL IF YOU HAVE: (1) Fevers to more than 101.5 F, (2) leg pain or swelling (3) Drainage or fluid from incision that may be foul smelling, increased tenderness or soreness at the wound or the wound edges are no longer together, redness or swelling at the incision site. (4) Night Sweats (5) Shaking, Chills (6) Persistent Nausea or Vomiting for over 24 hours.  Use your anti nausea medication as prescribed. (7) Call 911 if sudden onset of chest pain or shortness of breath that does not improve with 5-10 min of rest.     9. APPOINTMENT: Contact our office at [phone] for a follow-up appointment in 1 weeks following your procedure.  Our office hours are Monday-Friday, 8am-5pm.  Please try to call during these hours when we are better able to assist you.     If you have any additional questions, please do not hesitate to call the office and speak to either myself or the physician on call.     Office address:   [facility].   [address] [place] [id number]   [place] [address]    Discharged to home by car with relative. Discharged via wheelchair,  hospital escort: Yes.  Special equipment needed: Not Applicable    Be sure to schedule a follow-up appointment with your primary care doctor or any specialists as instructed.     Discharge Plan:   Influenza Vaccine Indication: Indicated: 9 to 64 years of age  Influenza Vaccine Given - only chart on this line when given: Influenza Vaccine Given (See MAR)    I understand that a diet low in cholesterol, fat, and sodium is recommended for good health. Unless I have been given specific instructions below for another diet, I accept this instruction as my diet prescription.   Other diet: Follow Diet plan as instructed.     Special Instructions: None    · Is patient discharged on Warfarin / Coumadin?   No     Depression / Suicide Risk    As you are discharged from this [facility] facility, it is important to learn how to keep safe from harming yourself.    Recognize the warning signs:  · Abrupt changes in personality, positive or negative- including increase in energy   · Giving away possessions  · Change in eating patterns- significant weight changes-  positive or negative  · Change in sleeping patterns- unable to sleep or sleeping all the time   · Unwillingness or inability to communicate  · Depression  · Unusual sadness, discouragement and loneliness  · Talk of wanting to die  · Neglect of personal appearance   · Rebelliousness- reckless behavior  · Withdrawal from people/activities they love  · Confusion- inability to concentrate     If you or a loved one observes any of these behaviors or has concerns about self-harm, here's what you can do:  · Talk about it- your feelings and reasons for harming yourself  · Remove any means that you might use to hurt yourself (examples: pills, rope, extension cords, firearm)  · Get professional help from the community (Mental Health, Substance Abuse, psychological counseling)  · Do not be alone:Call your Safe Contact- someone whom you trust who will be there for you.  · Call your  local CRISIS HOTLINE 445-4122 or 956-182-2941  · Call your local Children's Mobile Crisis Response Team [place] (724) 292-2372 or www.Sape  · Call the toll free National Suicide Prevention Hotlines   · National Suicide Prevention Lifeline 699-440-LGJZ (1006)  · National TUNJI Line Network 800-SUICIDE (932-3902)

## 2018-11-22 NOTE — PROGRESS NOTES
· 2 RN skin check complete.   · Devices in place YES - SCD.  · Skin assessed under devices YES.  · Confirmed pressure ulcers found on N/A.  · New potential pressure ulcers noted on N/A.

## 2018-11-22 NOTE — PROGRESS NOTES
Surgical Progress Note    Author: Krzysztof Mancini Date & Time created: 2018   11:11 AM     Interval Events:  Pt doing well POD #1 s/p sleeve gastrectomy. Sitting up in chair. Denies nausea, vomiting. Ambulating, voiding, tolerating PO. Alert and oriented. NAD. Breathing unlabored. Abdomen soft, mild distention, tender to incision sites. Dressings clean, dry, intact. VS reviewed. Labs reviewed. Hopefully home later today. Discussed with Dr. Hull.  Review of Systems   Constitutional: Negative for chills and fever.   Respiratory: Negative for cough and shortness of breath.    Gastrointestinal: Positive for abdominal pain. Negative for constipation, diarrhea, heartburn, nausea and vomiting.   Skin: Negative for itching and rash.     Hemodynamics:  Temp (24hrs), Av.5 °C (97.7 °F), Min:35.8 °C (96.5 °F), Max:37.2 °C (99 °F)  Temperature: 36.9 °C (98.5 °F)  Pulse  Av.6  Min: 57  Max: 85Heart Rate (Monitored): (!) 58  Blood Pressure: 143/76, NIBP: 134/53     Respiratory:    Respiration: 16, Pulse Oximetry: 90 %           Neuro:  GCS       Fluids:    Intake/Output Summary (Last 24 hours) at 18 1111  Last data filed at 18 0730   Gross per 24 hour   Intake              740 ml   Output               10 ml   Net              730 ml       Current Diet Order   Procedures   • Diet Order Clear Liquid     Physical Exam   Constitutional: He is oriented to person, place, and time. He appears well-developed and well-nourished. No distress.   Cardiovascular: Normal rate.    Pulmonary/Chest: Effort normal. No respiratory distress.   Abdominal: Soft. He exhibits distension. There is tenderness. There is no rebound and no guarding.   Neurological: He is alert and oriented to person, place, and time.   Skin: Skin is warm and dry. No rash noted. He is not diaphoretic. No erythema.   Psychiatric: He has a normal mood and affect. His behavior is normal.   Vitals reviewed.    Labs:  Recent Results (from the past 24  hour(s))   HISTOLOGY REQUEST    Collection Time: 11/21/18  1:01 PM   Result Value Ref Range    Pathology Request Sent to Histo    CBC without Differential (blood)    Collection Time: 11/22/18  4:06 AM   Result Value Ref Range    WBC 12.3 (H) 4.8 - 10.8 K/uL    RBC 5.03 4.70 - 6.10 M/uL    Hemoglobin 13.1 (L) 14.0 - 18.0 g/dL    Hematocrit 40.4 (L) 42.0 - 52.0 %    MCV 80.3 (L) 81.4 - 97.8 fL    MCH 26.0 (L) 27.0 - 33.0 pg    MCHC 32.4 (L) 33.7 - 35.3 g/dL    RDW 42.5 35.9 - 50.0 fL    Platelet Count 237 164 - 446 K/uL    MPV 12.6 9.0 - 12.9 fL   Comp Metabolic Panel (CMP)    Collection Time: 11/22/18  4:06 AM   Result Value Ref Range    Sodium 136 135 - 145 mmol/L    Potassium 3.8 3.6 - 5.5 mmol/L    Chloride 103 96 - 112 mmol/L    Co2 22 20 - 33 mmol/L    Anion Gap 11.0 0.0 - 11.9    Glucose 118 (H) 65 - 99 mg/dL    Bun 6 (L) 8 - 22 mg/dL    Creatinine 0.58 0.50 - 1.40 mg/dL    Calcium 9.5 8.5 - 10.5 mg/dL    AST(SGOT) 22 12 - 45 U/L    ALT(SGPT) 45 2 - 50 U/L    Alkaline Phosphatase 70 30 - 99 U/L    Total Bilirubin 0.4 0.1 - 1.5 mg/dL    Albumin 4.4 3.2 - 4.9 g/dL    Total Protein 6.9 6.0 - 8.2 g/dL    Globulin 2.5 1.9 - 3.5 g/dL    A-G Ratio 1.8 g/dL   ESTIMATED GFR    Collection Time: 11/22/18  4:06 AM   Result Value Ref Range    GFR If African American >60 >60 mL/min/1.73 m 2    GFR If Non African American >60 >60 mL/min/1.73 m 2     Medical Decision Making, by Problem:  There are no active hospital problems to display for this patient.    Plan:  Pt doing well POD #1 s/p sleeve gastrectomy. Sitting up in chair. Denies nausea, vomiting. Ambulating, voiding, tolerating PO. Alert and oriented. NAD. Breathing unlabored. Abdomen soft, mild distention, tender to incision sites. Dressings clean, dry, intact. VS reviewed. Labs reviewed. Hopefully home later today. Discussed with Dr. Hull.    Quality Measures:  Quality-Core Measures   Reviewed items::  Labs reviewed and Medications reviewed  Fenton catheter::  No  Jossy  DVT prophylaxis pharmacological::  Enoxaparin (Lovenox)  DVT prophylaxis - mechanical:  SCDs  Ulcer Prophylaxis::  Yes      Discussed patient condition with Patient and Dr. Hull

## 2018-11-22 NOTE — CARE PLAN
Problem: Bowel/Gastric:  Goal: Normal bowel function is maintained or improved  Outcome: PROGRESSING AS EXPECTED  Hypoactive bowel sounds noted x4 quadrants; lap stab sites x5, bandaides in place, CDI    Problem: Fluid Volume:  Goal: Will maintain balanced intake and output  Outcome: PROGRESSING AS EXPECTED  Pt w adequate PO intake; Voiding WDL; No c/o n/v

## 2018-11-22 NOTE — PROGRESS NOTES
Pt dc'd home. IV removed. Pt left unit via WC with transport tech escort. Personal belongings with pt when leaving unit. Pt given discharge instructions prior to leaving unit including prescription and when to visit with physician; verbalizes understanding. Copy of discharge instructions with pt and in the chart.

## 2020-11-13 ENCOUNTER — HOSPITAL ENCOUNTER (OUTPATIENT)
Facility: MEDICAL CENTER | Age: 30
End: 2020-11-13
Payer: COMMERCIAL

## 2020-11-14 LAB
COVID ORDER STATUS COVID19: NORMAL
SARS-COV-2 RNA RESP QL NAA+PROBE: NOTDETECTED
SPECIMEN SOURCE: NORMAL

## 2022-03-23 ENCOUNTER — ANESTHESIA EVENT (OUTPATIENT)
Dept: SURGERY | Facility: MEDICAL CENTER | Age: 32
DRG: 872 | End: 2022-03-23
Payer: COMMERCIAL

## 2022-03-23 ENCOUNTER — ANESTHESIA (OUTPATIENT)
Dept: SURGERY | Facility: MEDICAL CENTER | Age: 32
DRG: 872 | End: 2022-03-23
Payer: COMMERCIAL

## 2022-03-23 ENCOUNTER — HOSPITAL ENCOUNTER (OUTPATIENT)
Facility: MEDICAL CENTER | Age: 32
End: 2022-03-23
Attending: PHYSICIAN ASSISTANT
Payer: COMMERCIAL

## 2022-03-23 ENCOUNTER — HOSPITAL ENCOUNTER (INPATIENT)
Facility: MEDICAL CENTER | Age: 32
LOS: 3 days | DRG: 872 | End: 2022-03-26
Attending: EMERGENCY MEDICINE | Admitting: INTERNAL MEDICINE
Payer: COMMERCIAL

## 2022-03-23 ENCOUNTER — APPOINTMENT (OUTPATIENT)
Dept: RADIOLOGY | Facility: MEDICAL CENTER | Age: 32
DRG: 872 | End: 2022-03-23
Attending: EMERGENCY MEDICINE
Payer: COMMERCIAL

## 2022-03-23 DIAGNOSIS — E66.01 MORBID OBESITY (HCC): ICD-10-CM

## 2022-03-23 DIAGNOSIS — E86.0 DEHYDRATION: ICD-10-CM

## 2022-03-23 DIAGNOSIS — E87.1 HYPONATREMIA: ICD-10-CM

## 2022-03-23 DIAGNOSIS — N50.89 SCROTAL SWELLING: ICD-10-CM

## 2022-03-23 DIAGNOSIS — E11.65 TYPE 2 DIABETES MELLITUS WITH HYPERGLYCEMIA, WITHOUT LONG-TERM CURRENT USE OF INSULIN (HCC): ICD-10-CM

## 2022-03-23 DIAGNOSIS — A41.9 SEPSIS WITHOUT ACUTE ORGAN DYSFUNCTION, DUE TO UNSPECIFIED ORGANISM (HCC): ICD-10-CM

## 2022-03-23 DIAGNOSIS — N49.3 FOURNIER'S GANGRENE: ICD-10-CM

## 2022-03-23 LAB
ALBUMIN SERPL BCP-MCNC: 4 G/DL (ref 3.2–4.9)
ALBUMIN/GLOB SERPL: 1.1 G/DL
ALP SERPL-CCNC: 89 U/L (ref 30–99)
ALT SERPL-CCNC: 32 U/L (ref 2–50)
ANION GAP SERPL CALC-SCNC: 16 MMOL/L (ref 7–16)
APPEARANCE UR: CLEAR
APTT PPP: 28.3 SEC (ref 24.7–36)
AST SERPL-CCNC: 21 U/L (ref 12–45)
BASOPHILS # BLD AUTO: 0.3 % (ref 0–1.8)
BASOPHILS # BLD: 0.04 K/UL (ref 0–0.12)
BILIRUB SERPL-MCNC: 0.8 MG/DL (ref 0.1–1.5)
BILIRUB UR QL STRIP.AUTO: NEGATIVE
BUN SERPL-MCNC: 7 MG/DL (ref 8–22)
CALCIUM SERPL-MCNC: 9.4 MG/DL (ref 8.4–10.2)
CHLORIDE SERPL-SCNC: 99 MMOL/L (ref 96–112)
CO2 SERPL-SCNC: 19 MMOL/L (ref 20–33)
COLOR UR: YELLOW
CREAT SERPL-MCNC: 0.61 MG/DL (ref 0.5–1.4)
CRP SERPL HS-MCNC: 22.67 MG/DL (ref 0–0.75)
EOSINOPHIL # BLD AUTO: 0.08 K/UL (ref 0–0.51)
EOSINOPHIL NFR BLD: 0.7 % (ref 0–6.9)
ERYTHROCYTE [DISTWIDTH] IN BLOOD BY AUTOMATED COUNT: 37.4 FL (ref 35.9–50)
ERYTHROCYTE [SEDIMENTATION RATE] IN BLOOD BY WESTERGREN METHOD: 46 MM/HOUR (ref 0–20)
GFR SERPLBLD CREATININE-BSD FMLA CKD-EPI: 132 ML/MIN/1.73 M 2
GLOBULIN SER CALC-MCNC: 3.5 G/DL (ref 1.9–3.5)
GLUCOSE BLD STRIP.AUTO-MCNC: 232 MG/DL (ref 65–99)
GLUCOSE SERPL-MCNC: 285 MG/DL (ref 65–99)
GLUCOSE UR STRIP.AUTO-MCNC: 500 MG/DL
HCT VFR BLD AUTO: 41 % (ref 42–52)
HGB BLD-MCNC: 13.8 G/DL (ref 14–18)
IMM GRANULOCYTES # BLD AUTO: 0.12 K/UL (ref 0–0.11)
IMM GRANULOCYTES NFR BLD AUTO: 1 % (ref 0–0.9)
INR PPP: 1.11 (ref 0.87–1.13)
KETONES UR STRIP.AUTO-MCNC: >=80 MG/DL
LACTATE BLD-SCNC: 1.2 MMOL/L (ref 0.5–2)
LACTATE BLD-SCNC: 1.7 MMOL/L (ref 0.5–2)
LEUKOCYTE ESTERASE UR QL STRIP.AUTO: NEGATIVE
LIPASE SERPL-CCNC: 43 U/L (ref 7–58)
LYMPHOCYTES # BLD AUTO: 1.56 K/UL (ref 1–4.8)
LYMPHOCYTES NFR BLD: 12.9 % (ref 22–41)
MCH RBC QN AUTO: 27.8 PG (ref 27–33)
MCHC RBC AUTO-ENTMCNC: 33.7 G/DL (ref 33.7–35.3)
MCV RBC AUTO: 82.7 FL (ref 81.4–97.8)
MICRO URNS: ABNORMAL
MONOCYTES # BLD AUTO: 0.79 K/UL (ref 0–0.85)
MONOCYTES NFR BLD AUTO: 6.5 % (ref 0–13.4)
NEUTROPHILS # BLD AUTO: 9.49 K/UL (ref 1.82–7.42)
NEUTROPHILS NFR BLD: 78.6 % (ref 44–72)
NITRITE UR QL STRIP.AUTO: NEGATIVE
NRBC # BLD AUTO: 0 K/UL
NRBC BLD-RTO: 0 /100 WBC
PH UR STRIP.AUTO: 5 [PH] (ref 5–8)
PLATELET # BLD AUTO: 190 K/UL (ref 164–446)
PMV BLD AUTO: 11.3 FL (ref 9–12.9)
POTASSIUM SERPL-SCNC: 3.7 MMOL/L (ref 3.6–5.5)
PROCALCITONIN SERPL-MCNC: 0.19 NG/ML
PROT SERPL-MCNC: 7.5 G/DL (ref 6–8.2)
PROT UR QL STRIP: NEGATIVE MG/DL
PROTHROMBIN TIME: 13.4 SEC (ref 12–14.6)
RBC # BLD AUTO: 4.96 M/UL (ref 4.7–6.1)
RBC UR QL AUTO: NEGATIVE
SARS-COV+SARS-COV-2 AG RESP QL IA.RAPID: NOTDETECTED
SODIUM SERPL-SCNC: 134 MMOL/L (ref 135–145)
SP GR UR STRIP.AUTO: 1.01
SPECIMEN SOURCE: NORMAL
WBC # BLD AUTO: 12.1 K/UL (ref 4.8–10.8)

## 2022-03-23 PROCEDURE — 700101 HCHG RX REV CODE 250: Performed by: ANESTHESIOLOGY

## 2022-03-23 PROCEDURE — 160009 HCHG ANES TIME/MIN: Performed by: UROLOGY

## 2022-03-23 PROCEDURE — 83605 ASSAY OF LACTIC ACID: CPT | Mod: 91

## 2022-03-23 PROCEDURE — 160048 HCHG OR STATISTICAL LEVEL 1-5: Performed by: UROLOGY

## 2022-03-23 PROCEDURE — 87205 SMEAR GRAM STAIN: CPT

## 2022-03-23 PROCEDURE — 83690 ASSAY OF LIPASE: CPT

## 2022-03-23 PROCEDURE — 700105 HCHG RX REV CODE 258: Performed by: INTERNAL MEDICINE

## 2022-03-23 PROCEDURE — 94760 N-INVAS EAR/PLS OXIMETRY 1: CPT

## 2022-03-23 PROCEDURE — A9270 NON-COVERED ITEM OR SERVICE: HCPCS | Performed by: INTERNAL MEDICINE

## 2022-03-23 PROCEDURE — 71045 X-RAY EXAM CHEST 1 VIEW: CPT

## 2022-03-23 PROCEDURE — 87070 CULTURE OTHR SPECIMN AEROBIC: CPT | Mod: 91

## 2022-03-23 PROCEDURE — 96366 THER/PROPH/DIAG IV INF ADDON: CPT

## 2022-03-23 PROCEDURE — 96368 THER/DIAG CONCURRENT INF: CPT

## 2022-03-23 PROCEDURE — 87075 CULTR BACTERIA EXCEPT BLOOD: CPT | Mod: 91

## 2022-03-23 PROCEDURE — 85025 COMPLETE CBC W/AUTO DIFF WBC: CPT

## 2022-03-23 PROCEDURE — 700102 HCHG RX REV CODE 250 W/ 637 OVERRIDE(OP): Performed by: INTERNAL MEDICINE

## 2022-03-23 PROCEDURE — 85730 THROMBOPLASTIN TIME PARTIAL: CPT

## 2022-03-23 PROCEDURE — 85652 RBC SED RATE AUTOMATED: CPT

## 2022-03-23 PROCEDURE — A6403 STERILE GAUZE>16 <= 48 SQ IN: HCPCS | Performed by: UROLOGY

## 2022-03-23 PROCEDURE — 700111 HCHG RX REV CODE 636 W/ 250 OVERRIDE (IP): Performed by: ANESTHESIOLOGY

## 2022-03-23 PROCEDURE — 82962 GLUCOSE BLOOD TEST: CPT

## 2022-03-23 PROCEDURE — 87205 SMEAR GRAM STAIN: CPT | Mod: 91

## 2022-03-23 PROCEDURE — 93005 ELECTROCARDIOGRAM TRACING: CPT | Performed by: EMERGENCY MEDICINE

## 2022-03-23 PROCEDURE — 700101 HCHG RX REV CODE 250: Performed by: EMERGENCY MEDICINE

## 2022-03-23 PROCEDURE — 88305 TISSUE EXAM BY PATHOLOGIST: CPT

## 2022-03-23 PROCEDURE — 80053 COMPREHEN METABOLIC PANEL: CPT

## 2022-03-23 PROCEDURE — 99285 EMERGENCY DEPT VISIT HI MDM: CPT

## 2022-03-23 PROCEDURE — 83036 HEMOGLOBIN GLYCOSYLATED A1C: CPT

## 2022-03-23 PROCEDURE — 87040 BLOOD CULTURE FOR BACTERIA: CPT

## 2022-03-23 PROCEDURE — 700117 HCHG RX CONTRAST REV CODE 255: Performed by: EMERGENCY MEDICINE

## 2022-03-23 PROCEDURE — 99223 1ST HOSP IP/OBS HIGH 75: CPT | Performed by: INTERNAL MEDICINE

## 2022-03-23 PROCEDURE — 160002 HCHG RECOVERY MINUTES (STAT): Performed by: UROLOGY

## 2022-03-23 PROCEDURE — 96375 TX/PRO/DX INJ NEW DRUG ADDON: CPT

## 2022-03-23 PROCEDURE — 96367 TX/PROPH/DG ADDL SEQ IV INF: CPT

## 2022-03-23 PROCEDURE — 770006 HCHG ROOM/CARE - MED/SURG/GYN SEMI*

## 2022-03-23 PROCEDURE — 36415 COLL VENOUS BLD VENIPUNCTURE: CPT

## 2022-03-23 PROCEDURE — 72193 CT PELVIS W/DYE: CPT

## 2022-03-23 PROCEDURE — 160035 HCHG PACU - 1ST 60 MINS PHASE I: Performed by: UROLOGY

## 2022-03-23 PROCEDURE — 87086 URINE CULTURE/COLONY COUNT: CPT

## 2022-03-23 PROCEDURE — 160039 HCHG SURGERY MINUTES - EA ADDL 1 MIN LEVEL 3: Performed by: UROLOGY

## 2022-03-23 PROCEDURE — 87426 SARSCOV CORONAVIRUS AG IA: CPT

## 2022-03-23 PROCEDURE — 700105 HCHG RX REV CODE 258: Performed by: EMERGENCY MEDICINE

## 2022-03-23 PROCEDURE — 700101 HCHG RX REV CODE 250: Performed by: INTERNAL MEDICINE

## 2022-03-23 PROCEDURE — 87075 CULTR BACTERIA EXCEPT BLOOD: CPT

## 2022-03-23 PROCEDURE — 85610 PROTHROMBIN TIME: CPT

## 2022-03-23 PROCEDURE — 160028 HCHG SURGERY MINUTES - 1ST 30 MINS LEVEL 3: Performed by: UROLOGY

## 2022-03-23 PROCEDURE — 700111 HCHG RX REV CODE 636 W/ 250 OVERRIDE (IP): Performed by: INTERNAL MEDICINE

## 2022-03-23 PROCEDURE — 96365 THER/PROPH/DIAG IV INF INIT: CPT

## 2022-03-23 PROCEDURE — 81003 URINALYSIS AUTO W/O SCOPE: CPT

## 2022-03-23 PROCEDURE — 700111 HCHG RX REV CODE 636 W/ 250 OVERRIDE (IP): Performed by: EMERGENCY MEDICINE

## 2022-03-23 PROCEDURE — 700105 HCHG RX REV CODE 258: Performed by: UROLOGY

## 2022-03-23 PROCEDURE — 87070 CULTURE OTHR SPECIMN AEROBIC: CPT

## 2022-03-23 PROCEDURE — 86140 C-REACTIVE PROTEIN: CPT

## 2022-03-23 PROCEDURE — 84145 PROCALCITONIN (PCT): CPT

## 2022-03-23 RX ORDER — OXYCODONE HYDROCHLORIDE 10 MG/1
10 TABLET ORAL
Status: DISCONTINUED | OUTPATIENT
Start: 2022-03-23 | End: 2022-03-26 | Stop reason: HOSPADM

## 2022-03-23 RX ORDER — LIDOCAINE HYDROCHLORIDE 20 MG/ML
INJECTION, SOLUTION EPIDURAL; INFILTRATION; INTRACAUDAL; PERINEURAL PRN
Status: DISCONTINUED | OUTPATIENT
Start: 2022-03-23 | End: 2022-03-24 | Stop reason: SURG

## 2022-03-23 RX ORDER — HYDROMORPHONE HYDROCHLORIDE 1 MG/ML
0.5 INJECTION, SOLUTION INTRAMUSCULAR; INTRAVENOUS; SUBCUTANEOUS
Status: DISCONTINUED | OUTPATIENT
Start: 2022-03-23 | End: 2022-03-26 | Stop reason: HOSPADM

## 2022-03-23 RX ORDER — HYDROMORPHONE HYDROCHLORIDE 1 MG/ML
1 INJECTION, SOLUTION INTRAMUSCULAR; INTRAVENOUS; SUBCUTANEOUS ONCE
Status: COMPLETED | OUTPATIENT
Start: 2022-03-23 | End: 2022-03-23

## 2022-03-23 RX ORDER — DEXTROSE MONOHYDRATE 25 G/50ML
25 INJECTION, SOLUTION INTRAVENOUS
Status: DISCONTINUED | OUTPATIENT
Start: 2022-03-23 | End: 2022-03-26 | Stop reason: HOSPADM

## 2022-03-23 RX ORDER — SODIUM CHLORIDE, SODIUM LACTATE, POTASSIUM CHLORIDE, AND CALCIUM CHLORIDE .6; .31; .03; .02 G/100ML; G/100ML; G/100ML; G/100ML
1000 INJECTION, SOLUTION INTRAVENOUS
Status: DISCONTINUED | OUTPATIENT
Start: 2022-03-23 | End: 2022-03-26 | Stop reason: HOSPADM

## 2022-03-23 RX ORDER — MIDAZOLAM HYDROCHLORIDE 1 MG/ML
INJECTION INTRAMUSCULAR; INTRAVENOUS PRN
Status: DISCONTINUED | OUTPATIENT
Start: 2022-03-23 | End: 2022-03-24 | Stop reason: SURG

## 2022-03-23 RX ORDER — AMOXICILLIN 250 MG
2 CAPSULE ORAL 2 TIMES DAILY
Status: DISCONTINUED | OUTPATIENT
Start: 2022-03-23 | End: 2022-03-26 | Stop reason: HOSPADM

## 2022-03-23 RX ORDER — OXYCODONE HYDROCHLORIDE 5 MG/1
5 TABLET ORAL
Status: DISCONTINUED | OUTPATIENT
Start: 2022-03-23 | End: 2022-03-26 | Stop reason: HOSPADM

## 2022-03-23 RX ORDER — BISACODYL 10 MG
10 SUPPOSITORY, RECTAL RECTAL
Status: DISCONTINUED | OUTPATIENT
Start: 2022-03-23 | End: 2022-03-26 | Stop reason: HOSPADM

## 2022-03-23 RX ORDER — AMLODIPINE BESYLATE 5 MG/1
2.5 TABLET ORAL EVERY EVENING
Status: DISCONTINUED | OUTPATIENT
Start: 2022-03-23 | End: 2022-03-26 | Stop reason: HOSPADM

## 2022-03-23 RX ORDER — ACETAMINOPHEN 500 MG
1000 TABLET ORAL EVERY 6 HOURS PRN
COMMUNITY

## 2022-03-23 RX ORDER — IBUPROFEN 200 MG
600 TABLET ORAL EVERY 6 HOURS PRN
COMMUNITY

## 2022-03-23 RX ORDER — LEVOFLOXACIN 750 MG/1
750 TABLET, FILM COATED ORAL DAILY
Status: ON HOLD | COMMUNITY
End: 2022-03-26

## 2022-03-23 RX ORDER — POLYETHYLENE GLYCOL 3350 17 G/17G
1 POWDER, FOR SOLUTION ORAL
Status: DISCONTINUED | OUTPATIENT
Start: 2022-03-23 | End: 2022-03-26 | Stop reason: HOSPADM

## 2022-03-23 RX ORDER — CLINDAMYCIN PHOSPHATE 900 MG/50ML
900 INJECTION, SOLUTION INTRAVENOUS EVERY 8 HOURS
Status: DISCONTINUED | OUTPATIENT
Start: 2022-03-23 | End: 2022-03-24

## 2022-03-23 RX ORDER — SODIUM CHLORIDE 9 MG/ML
1000 INJECTION, SOLUTION INTRAVENOUS ONCE
Status: COMPLETED | OUTPATIENT
Start: 2022-03-23 | End: 2022-03-23

## 2022-03-23 RX ORDER — SODIUM CHLORIDE, SODIUM LACTATE, POTASSIUM CHLORIDE, CALCIUM CHLORIDE 600; 310; 30; 20 MG/100ML; MG/100ML; MG/100ML; MG/100ML
INJECTION, SOLUTION INTRAVENOUS CONTINUOUS
Status: DISCONTINUED | OUTPATIENT
Start: 2022-03-23 | End: 2022-03-26 | Stop reason: HOSPADM

## 2022-03-23 RX ORDER — ACETAMINOPHEN 325 MG/1
650 TABLET ORAL EVERY 6 HOURS PRN
Status: DISCONTINUED | OUTPATIENT
Start: 2022-03-23 | End: 2022-03-26 | Stop reason: HOSPADM

## 2022-03-23 RX ORDER — SODIUM CHLORIDE, SODIUM LACTATE, POTASSIUM CHLORIDE, CALCIUM CHLORIDE 600; 310; 30; 20 MG/100ML; MG/100ML; MG/100ML; MG/100ML
INJECTION, SOLUTION INTRAVENOUS CONTINUOUS
Status: ACTIVE | OUTPATIENT
Start: 2022-03-23 | End: 2022-03-24

## 2022-03-23 RX ORDER — CLINDAMYCIN PHOSPHATE 900 MG/50ML
900 INJECTION, SOLUTION INTRAVENOUS ONCE
Status: COMPLETED | OUTPATIENT
Start: 2022-03-23 | End: 2022-03-23

## 2022-03-23 RX ORDER — SUCCINYLCHOLINE CHLORIDE 20 MG/ML
INJECTION INTRAMUSCULAR; INTRAVENOUS PRN
Status: DISCONTINUED | OUTPATIENT
Start: 2022-03-23 | End: 2022-03-24 | Stop reason: SURG

## 2022-03-23 RX ADMIN — VANCOMYCIN HYDROCHLORIDE 3 G: 500 INJECTION, POWDER, LYOPHILIZED, FOR SOLUTION INTRAVENOUS at 16:52

## 2022-03-23 RX ADMIN — MIDAZOLAM HYDROCHLORIDE 2 MG: 1 INJECTION, SOLUTION INTRAMUSCULAR; INTRAVENOUS at 23:29

## 2022-03-23 RX ADMIN — INSULIN HUMAN 2 UNITS: 100 INJECTION, SOLUTION PARENTERAL at 20:03

## 2022-03-23 RX ADMIN — FENTANYL CITRATE 25 MCG: 50 INJECTION, SOLUTION INTRAMUSCULAR; INTRAVENOUS at 23:29

## 2022-03-23 RX ADMIN — IOHEXOL 100 ML: 350 INJECTION, SOLUTION INTRAVENOUS at 17:42

## 2022-03-23 RX ADMIN — SODIUM CHLORIDE, POTASSIUM CHLORIDE, SODIUM LACTATE AND CALCIUM CHLORIDE: 600; 310; 30; 20 INJECTION, SOLUTION INTRAVENOUS at 19:42

## 2022-03-23 RX ADMIN — PIPERACILLIN AND TAZOBACTAM 4.5 G: 4; .5 INJECTION, POWDER, LYOPHILIZED, FOR SOLUTION INTRAVENOUS; PARENTERAL at 16:37

## 2022-03-23 RX ADMIN — SODIUM CHLORIDE, POTASSIUM CHLORIDE, SODIUM LACTATE AND CALCIUM CHLORIDE: 600; 310; 30; 20 INJECTION, SOLUTION INTRAVENOUS at 23:05

## 2022-03-23 RX ADMIN — ROCURONIUM BROMIDE 47 MG: 10 INJECTION, SOLUTION INTRAVENOUS at 23:38

## 2022-03-23 RX ADMIN — PIPERACILLIN AND TAZOBACTAM 4.5 G: 4; .5 INJECTION, POWDER, LYOPHILIZED, FOR SOLUTION INTRAVENOUS; PARENTERAL at 20:45

## 2022-03-23 RX ADMIN — SUCCINYLCHOLINE CHLORIDE 13 MG: 20 INJECTION, SOLUTION INTRAMUSCULAR; INTRAVENOUS at 23:31

## 2022-03-23 RX ADMIN — PROPOFOL 200 MG: 10 INJECTION, EMULSION INTRAVENOUS at 23:31

## 2022-03-23 RX ADMIN — ROCURONIUM BROMIDE 10 MG: 10 INJECTION, SOLUTION INTRAVENOUS at 23:47

## 2022-03-23 RX ADMIN — SODIUM CHLORIDE 1000 ML: 9 INJECTION, SOLUTION INTRAVENOUS at 16:37

## 2022-03-23 RX ADMIN — ACETAMINOPHEN 650 MG: 325 TABLET, FILM COATED ORAL at 22:29

## 2022-03-23 RX ADMIN — LIDOCAINE HYDROCHLORIDE 100 MG: 20 INJECTION, SOLUTION EPIDURAL; INFILTRATION; INTRACAUDAL; PERINEURAL at 23:31

## 2022-03-23 RX ADMIN — CLINDAMYCIN IN 5 PERCENT DEXTROSE 900 MG: 18 INJECTION, SOLUTION INTRAVENOUS at 16:37

## 2022-03-23 RX ADMIN — ROCURONIUM BROMIDE 3 MG: 10 INJECTION, SOLUTION INTRAVENOUS at 23:31

## 2022-03-23 RX ADMIN — CLINDAMYCIN IN 5 PERCENT DEXTROSE 900 MG: 18 INJECTION, SOLUTION INTRAVENOUS at 21:27

## 2022-03-23 RX ADMIN — HYDROMORPHONE HYDROCHLORIDE 1 MG: 1 INJECTION, SOLUTION INTRAMUSCULAR; INTRAVENOUS; SUBCUTANEOUS at 16:36

## 2022-03-23 ASSESSMENT — COGNITIVE AND FUNCTIONAL STATUS - GENERAL
MOBILITY SCORE: 24
SUGGESTED CMS G CODE MODIFIER MOBILITY: CH
DAILY ACTIVITIY SCORE: 24
SUGGESTED CMS G CODE MODIFIER DAILY ACTIVITY: CH

## 2022-03-23 ASSESSMENT — PAIN DESCRIPTION - PAIN TYPE: TYPE: ACUTE PAIN

## 2022-03-23 ASSESSMENT — LIFESTYLE VARIABLES
TOTAL SCORE: 0
HAVE PEOPLE ANNOYED YOU BY CRITICIZING YOUR DRINKING: NO
HAVE YOU EVER FELT YOU SHOULD CUT DOWN ON YOUR DRINKING: NO
TOTAL SCORE: 0
HOW MANY TIMES IN THE PAST YEAR HAVE YOU HAD 5 OR MORE DRINKS IN A DAY: 0
EVER FELT BAD OR GUILTY ABOUT YOUR DRINKING: NO
TOTAL SCORE: 0
AVERAGE NUMBER OF DAYS PER WEEK YOU HAVE A DRINK CONTAINING ALCOHOL: 0
EVER HAD A DRINK FIRST THING IN THE MORNING TO STEADY YOUR NERVES TO GET RID OF A HANGOVER: NO
ON A TYPICAL DAY WHEN YOU DRINK ALCOHOL HOW MANY DRINKS DO YOU HAVE: 0
CONSUMPTION TOTAL: NEGATIVE
SUBSTANCE_ABUSE: 0
ALCOHOL_USE: NO

## 2022-03-23 ASSESSMENT — ENCOUNTER SYMPTOMS
SORE THROAT: 0
ABDOMINAL PAIN: 0
CHILLS: 1
EYE REDNESS: 0
DIZZINESS: 0
SHORTNESS OF BREATH: 0
FEVER: 1
NAUSEA: 0
VOMITING: 0
MUSCULOSKELETAL NEGATIVE: 1

## 2022-03-23 ASSESSMENT — PATIENT HEALTH QUESTIONNAIRE - PHQ9
SUM OF ALL RESPONSES TO PHQ9 QUESTIONS 1 AND 2: 0
1. LITTLE INTEREST OR PLEASURE IN DOING THINGS: NOT AT ALL
2. FEELING DOWN, DEPRESSED, IRRITABLE, OR HOPELESS: NOT AT ALL

## 2022-03-23 NOTE — ED TRIAGE NOTES
Chief Complaint   Patient presents with   • Groin Pain     Started about two weeks ago, has been taking antibx without relief     BP (!) 166/85   Pulse (!) 127   Temp 37.3 °C (99.2 °F) (Temporal)   Resp 20   Ht 1.829 m (6')   Wt (!) 184 kg (405 lb)   SpO2 94%   BMI 54.93 kg/m²     Has this patient been vaccinated for COVID Yes  If not, would they like to be vaccinated while in the ER if eligible?  na  Would the patient like to speak with the ERP about the possibility of receiving the COVID vaccine today before making a decision? Na

## 2022-03-23 NOTE — ED PROVIDER NOTES
ED Provider Note    CHIEF COMPLAINT  Chief Complaint   Patient presents with   • Groin Pain     Started about two weeks ago, has been taking antibx without relief       HPI    Primary care provider: Lele Middleton M.D.  Means of arrival: POV  History obtained from: Patient and mother and transferring urologist  History limited by: Nothing    Ga Mora is a 32 y.o. male who presents with testicular pain, swelling, and redness.  Began several days ago but starting yesterday he started to notice some drainage.  He finally let his mother know about this he felt embarrassed, so she took him to be evaluated at [facility], operated by [facility], [facility] on 3/21; today mom drove the patient to their urologist, they saw the patient in clinic and noted a small eschar on his scrotum with significant beefy erythema with concerns for early Ludivina's gangrene.  The patient has had some chills on and off for last several days but denies any documented fevers.  No chest pain.  No dysuria.  No falls or injuries or trauma.  No prior episodes.  Risk factors include diabetes and hypertension and morbid obesity.  No abdominal pain or vomiting.  Last oral intake was around 12:30 PM today.  Pain is isolated to his scrotum he has significant swelling and redness and throbbing pain.  He does have a distant history of a hydrocele that required repair approximately 15 years ago.  He was started on Levaquin 2 days ago, but since it started draining    REVIEW OF SYSTEMS  Constitutional: Negative for fever but positive for chills.   HENT: Negative for rhinorrhea or sore throat.    Respiratory: Negative for cough or shortness of breath.    Cardiovascular: Negative for chest pain or syncope.   Gastrointestinal: Negative for nausea, vomiting, or abdominal pain.   Genitourinary: Positive for scrotal pain and swelling, negative for dysuria.  Musculoskeletal:  Negative for back pain or joint pain.   Skin: Negative for itching or rash.   Neurological: Negative for sensory or motor changes.   See HPI for further details. All other systems are negative.     PAST MEDICAL HISTORY   has a past medical history of Anxiety, Diabetes (HCC), Hypertension, and Snoring.    PAST FAMILY HISTORY  History reviewed. No pertinent family history.    SOCIAL HISTORY  Social History     Tobacco Use   • Smoking status: Never Smoker   • Smokeless tobacco: Never Used   Substance and Sexual Activity   • Alcohol use: No   • Drug use: No   • Sexual activity: Not on file       SURGICAL HISTORY   has a past surgical history that includes hydrocelectomy adult (2010); inguinal hernia repair (Bilateral, 2008/2009); tonsillectomy (2004); eye surgery (1995); gastric sleeve laparoscopy (11/21/2018); liver biopsy laparoscopic (11/21/2018); and explore scrotum (Left, 3/23/2022).    CURRENT MEDICATIONS  Home Medications     Reviewed by Olivia Calloway (Pharmacy Tech) on 03/23/22 at 1636  Med List Status: Complete   Medication Last Dose Status   acetaminophen (TYLENOL) 500 MG Tab 3/23/2022 Active   amLODIPine (NORVASC) 2.5 MG Tab 3/21/2022 Active   glyBURIDE (DIABETA) 5 MG Tab 3/21/2022 Active   ibuprofen (MOTRIN) 200 MG Tab 3/23/2022 Active   levoFLOXacin (LEVAQUIN) 750 MG tablet 3/22/2022 Active   metFORMIN (GLUCOPHAGE) 500 MG Tab 3/21/2022 Active   sertraline (ZOLOFT) 100 MG Tab 3/21/2022 Active                ALLERGIES  Allergies   Allergen Reactions   • Ceclor [Cefaclor] Rash     Childhood allergies when pt was 6 years old received a rash        PHYSICAL EXAM  VITAL SIGNS: /65   Pulse (!) 109 Comment: RN Notified  Temp 37.4 °C (99.3 °F) (Oral)   Resp 18   Ht 1.829 m (6')   Wt (!) 184 kg (405 lb)   SpO2 91%   BMI 54.93 kg/m²    Pulse ox interpretation: On room air, I interpret this pulse ox as normal.  Constitutional: Sitting up in mild distress.  HEENT: Normocephalic, atraumatic.  Posterior pharynx clear, mucous membranes dry.  Eyes:  EOMI. Normal sclerae.  Neck: Supple, nontender.  Chest/Pulmonary: Slightly diminished to ausculation bilaterally, no wheezes or rhonchi.  Cardiovascular: Tachycardic, regular rhythm, no murmur.  Abdomen: Soft, nontender; no rebound.  Limited due to habitus.  Back: No CVA or midline tenderness.   : Scrotum is exquisitely swollen with beefy erythema throughout, and at the 4 o'clock position on the inferior aspect of his left scrotum there is a small escharotic lesion with a tiny amount of suppurative drainage.  Musculoskeletal: No deformity or edema.  Neuro: Clear speech, alert, no focal weakness or asymmetry.  Psych: Normal mood and affect.  Skin: Scrotal erythema present, skin otherwise warm and dry.      DIAGNOSTIC STUDIES / PROCEDURES    LABS & EKG  Results for orders placed or performed during the hospital encounter of 03/23/22   CBC With Differential   Result Value Ref Range    WBC 12.1 (H) 4.8 - 10.8 K/uL    RBC 4.96 4.70 - 6.10 M/uL    Hemoglobin 13.8 (L) 14.0 - 18.0 g/dL    Hematocrit 41.0 (L) 42.0 - 52.0 %    MCV 82.7 81.4 - 97.8 fL    MCH 27.8 27.0 - 33.0 pg    MCHC 33.7 33.7 - 35.3 g/dL    RDW 37.4 35.9 - 50.0 fL    Platelet Count 190 164 - 446 K/uL    MPV 11.3 9.0 - 12.9 fL    Neutrophils-Polys 78.60 (H) 44.00 - 72.00 %    Lymphocytes 12.90 (L) 22.00 - 41.00 %    Monocytes 6.50 0.00 - 13.40 %    Eosinophils 0.70 0.00 - 6.90 %    Basophils 0.30 0.00 - 1.80 %    Immature Granulocytes 1.00 (H) 0.00 - 0.90 %    Nucleated RBC 0.00 /100 WBC    Neutrophils (Absolute) 9.49 (H) 1.82 - 7.42 K/uL    Lymphs (Absolute) 1.56 1.00 - 4.80 K/uL    Monos (Absolute) 0.79 0.00 - 0.85 K/uL    Eos (Absolute) 0.08 0.00 - 0.51 K/uL    Baso (Absolute) 0.04 0.00 - 0.12 K/uL    Immature Granulocytes (abs) 0.12 (H) 0.00 - 0.11 K/uL    NRBC (Absolute) 0.00 K/uL   Comp Metabolic Panel   Result Value Ref Range    Sodium 134 (L) 135 - 145 mmol/L    Potassium 3.7 3.6 - 5.5 mmol/L     Chloride 99 96 - 112 mmol/L    Co2 19 (L) 20 - 33 mmol/L    Anion Gap 16.0 7.0 - 16.0    Glucose 285 (H) 65 - 99 mg/dL    Bun 7 (L) 8 - 22 mg/dL    Creatinine 0.61 0.50 - 1.40 mg/dL    Calcium 9.4 8.4 - 10.2 mg/dL    AST(SGOT) 21 12 - 45 U/L    ALT(SGPT) 32 2 - 50 U/L    Alkaline Phosphatase 89 30 - 99 U/L    Total Bilirubin 0.8 0.1 - 1.5 mg/dL    Albumin 4.0 3.2 - 4.9 g/dL    Total Protein 7.5 6.0 - 8.2 g/dL    Globulin 3.5 1.9 - 3.5 g/dL    A-G Ratio 1.1 g/dL   Lipase   Result Value Ref Range    Lipase 43 7 - 58 U/L   Lactic Acid   Result Value Ref Range    Lactic Acid 1.2 0.5 - 2.0 mmol/L   Lactic Acid   Result Value Ref Range    Lactic Acid 1.3 0.5 - 2.0 mmol/L   Blood Culture    Specimen: Peripheral; Blood   Result Value Ref Range    Significant Indicator NEG     Source BLD     Site PERIPHERAL     Culture Result       No Growth  Note: Blood cultures are incubated for 5 days and  are monitored continuously.Positive blood cultures  are called to the RN and reported as soon as  they are identified.  Blood culture testing and Gram stain, if indicated, are  performed at [facility], [address] [place] [address].  Positive blood cultures are  sent to [facility], [address] [place] [address], for organism identification and  susceptibility testing.     Blood Culture    Specimen: Peripheral; Blood   Result Value Ref Range    Significant Indicator NEG     Source BLD     Site PERIPHERAL     Culture Result       No Growth  Note: Blood cultures are incubated for 5 days and  are monitored continuously.Positive blood cultures  are called to the RN and reported as soon as  they are identified.  Blood culture testing and Gram stain, if indicated, are  performed at [facility], [address] [place] [address].  Positive blood cultures are  sent to [facility], [address] [place] [address], for organism identification  and  susceptibility testing.     Urinalysis    Specimen: Urine   Result Value Ref Range    Color Yellow     Character Clear     Specific Gravity 1.015 <1.035    Ph 5.0 5.0 - 8.0    Glucose 500 (A) Negative mg/dL    Ketones >=80 (A) Negative mg/dL    Protein Negative Negative mg/dL    Bilirubin Negative Negative    Nitrite Negative Negative    Leukocyte Esterase Negative Negative    Occult Blood Negative Negative    Micro Urine Req see below    CRP Quantitative (Non-Cardiac)   Result Value Ref Range    Stat C-Reactive Protein 22.67 (H) 0.00 - 0.75 mg/dL   Procalcitonin   Result Value Ref Range    Procalcitonin 0.19 <0.25 ng/mL   Sed Rate   Result Value Ref Range    Sed Rate Westergren 46 (H) 0 - 20 mm/hour   Prothrombin Time   Result Value Ref Range    PT 13.4 12.0 - 14.6 sec    INR 1.11 0.87 - 1.13   APTT   Result Value Ref Range    APTT 28.3 24.7 - 36.0 sec   SARS-COV Antigen TRACEY   Result Value Ref Range    SARS-CoV-2 Source Nasal Swab     SARS-COV ANTIGEN TRACEY NotDetected NotDetected   LACTIC ACID   Result Value Ref Range    Lactic Acid 1.7 0.5 - 2.0 mmol/L   ESTIMATED GFR   Result Value Ref Range    GFR (CKD-EPI) 132 >60 mL/min/1.73 m 2   HEMOGLOBIN A1C   Result Value Ref Range    Glycohemoglobin 10.5 (H) 4.0 - 5.6 %    Est Avg Glucose 255 mg/dL   CBC with Differential   Result Value Ref Range    WBC 10.4 4.8 - 10.8 K/uL    RBC 4.39 (L) 4.70 - 6.10 M/uL    Hemoglobin 12.0 (L) 14.0 - 18.0 g/dL    Hematocrit 36.6 (L) 42.0 - 52.0 %    MCV 83.4 81.4 - 97.8 fL    MCH 27.3 27.0 - 33.0 pg    MCHC 32.8 (L) 33.7 - 35.3 g/dL    RDW 38.7 35.9 - 50.0 fL    Platelet Count 156 (L) 164 - 446 K/uL    MPV 11.7 9.0 - 12.9 fL    Neutrophils-Polys 83.30 (H) 44.00 - 72.00 %    Lymphocytes 8.00 (L) 22.00 - 41.00 %    Monocytes 7.30 0.00 - 13.40 %    Eosinophils 0.10 0.00 - 6.90 %    Basophils 0.40 0.00 - 1.80 %    Immature Granulocytes 0.90 0.00 - 0.90 %    Nucleated RBC 0.00 /100 WBC    Neutrophils (Absolute) 8.69 (H) 1.82 - 7.42 K/uL     Lymphs (Absolute) 0.83 (L) 1.00 - 4.80 K/uL    Monos (Absolute) 0.76 0.00 - 0.85 K/uL    Eos (Absolute) 0.01 0.00 - 0.51 K/uL    Baso (Absolute) 0.04 0.00 - 0.12 K/uL    Immature Granulocytes (abs) 0.09 0.00 - 0.11 K/uL    NRBC (Absolute) 0.00 K/uL   Comp Metabolic Panel (CMP)   Result Value Ref Range    Sodium 132 (L) 135 - 145 mmol/L    Potassium 3.9 3.6 - 5.5 mmol/L    Chloride 99 96 - 112 mmol/L    Co2 19 (L) 20 - 33 mmol/L    Anion Gap 14.0 7.0 - 16.0    Glucose 257 (H) 65 - 99 mg/dL    Bun 6 (L) 8 - 22 mg/dL    Creatinine 0.51 0.50 - 1.40 mg/dL    Calcium 8.6 8.4 - 10.2 mg/dL    AST(SGOT) 20 12 - 45 U/L    ALT(SGPT) 27 2 - 50 U/L    Alkaline Phosphatase 75 30 - 99 U/L    Total Bilirubin 0.8 0.1 - 1.5 mg/dL    Albumin 3.4 3.2 - 4.9 g/dL    Total Protein 6.5 6.0 - 8.2 g/dL    Globulin 3.1 1.9 - 3.5 g/dL    A-G Ratio 1.1 g/dL   Histology Request   Result Value Ref Range    Pathology Request Sent to Histo    ESTIMATED GFR   Result Value Ref Range    GFR (CKD-EPI) 139 >60 mL/min/1.73 m 2   EKG   Result Value Ref Range    Report       [facility] Emergency Dept.    Test Date:  2022  Pt Name:    LAWRENCE ELLSWORTH               Department: [facility]  MRN:        [id number]                      Room:       2216  Gender:     Male                         Technician: [id number]  :        1990                   Requested By:YOVANI ROCK  Order #:    [id number]                    Reading MD: Yovani Rock MD    Measurements  Intervals                                Axis  Rate:       113                          P:          13  MI:         144                          QRS:        35  QRSD:       105                          T:          30  QT:         317  QTc:        435    Interpretive Statements  Sinus tachycardia  ST elev, probable normal early repol pattern  Baseline wander in lead(s) V4  Compared to ECG 2018 10:57:03  ST (T wave) deviation now present  Sinus rhythm no longer  present  T-wave abnormality no longer present  No STEMI  Electronically Signed On 3- 12:02:15 PDT by Anthony Hernandez MD     POCT glucose device results   Result Value Ref Range    POC Glucose, Blood 232 (H) 65 - 99 mg/dL   POCT glucose device results   Result Value Ref Range    POC Glucose, Blood 240 (H) 65 - 99 mg/dL   POCT glucose device results   Result Value Ref Range    POC Glucose, Blood 222 (H) 65 - 99 mg/dL         RADIOLOGY  CT-PELVIS WITH   Final Result      1.  There are bilateral hydroceles with associated fluid in the surrounding perineum. There is no enhancing fluid collection or gas within this fluid to suggest focal abscess.   2.  There are small reactive lymph nodes in the inguinal regions.   3.  There is no evidence of a perirectal or perianal abscess.      DX-CHEST-PORTABLE (1 VIEW)   Final Result      No acute cardiopulmonary process is seen.          COURSE & MEDICAL DECISION MAKING    This is a 32 y.o. male who presents with scrotal pain, redness, and swelling.  Tachycardic on arrival.    Differential Diagnosis includes but is not limited to:  Sepsis, Ludivina's gangrene, abscess, cellulitis    ED Course:  Pleasant but unfortunate 32-year-old male coming in with scrotal pain and swelling and redness, tachycardic, high suspicion for early Ludivina's.  The patient's urologist called me to let me know they were sending the patient in directly from clinic, requested labs, antibiotics, CT imaging, and they will arrange for an OR time.  Discussed with clinical pharmacist, given high risk infection plan vancomycin, Zosyn, as well as clindamycin.  Sepsis protocol initiated, thankfully patient not hypotensive, doubt septic shock.  N.p.o. until he goes to the OR, I will give a crystalloid fluid bolus, but he does not merit a 30 cc/kg crystalloid fluid bolus, especially given his large habitus.  I will treat his pain with hydromorphone.    The patient does have a leukocytosis but thankfully no  lactic acidosis.  He is hyperglycemic, but CO2 is only 19, plan to continue IV fluids.  Heart rate improving with pain meds and fluids.  Sed rate significantly elevated.  CT finally obtained showing bilateral hydroceles with fluid in the surrounding perineum, no fluid collection or gas noted on imaging.  There are reactive lymph nodes as well.  I reconsulted with the patient's urologist, Dr. Osorio, he will review imaging and reassess the patient to determine if he is indeed a surgical candidate or see if he will respond to antibiotics alone.  Consulted with hospitalist Dr. Balderas, she will kindly admit the patient for further work-up and treatment.  The patient is hemodynamically stable for admission in guarded condition.    Upon my evaluation, this patient had a high probability of imminent or life-threatening deterioration due to sepsis likely secondary to early Ludivina's gangrene.     I personally provided 35 minutes of total critical care time outside of time spent on separately billable/documented procedures. This required my direct attention, intervention, and management which included the following:  -review of laboratory data  -review of radiology studies  -discussion with consultants: Clinical pharmacist, urologist, hospitalist  -monitoring for potential decompensation with serial reassessments  -Very broad parenteral antibiotics for potential necrotizing infection, IV fluids    Medications   amLODIPine (NORVASC) tablet 2.5 mg ( Oral MAR Unhold 3/24/22 0113)   sertraline (Zoloft) tablet 200 mg ( Oral MAR Unhold 3/24/22 0113)   senna-docusate (PERICOLACE or SENOKOT S) 8.6-50 MG per tablet 2 Tablet (2 Tablets Oral Refused 3/24/22 0513)     And   polyethylene glycol/lytes (MIRALAX) PACKET 1 Packet ( Oral MAR Unhold 3/24/22 0113)     And   magnesium hydroxide (MILK OF MAGNESIA) suspension 30 mL ( Oral MAR Unhold 3/24/22 0113)     And   bisacodyl (DULCOLAX) suppository 10 mg ( Rectal MAR Unhold 3/24/22 0113)    lactated ringers infusion (BOLUS) ( Intravenous MAR Unhold 3/24/22 0113)   lactated ringers infusion ( Intravenous New Bag 3/24/22 0626)   acetaminophen (Tylenol) tablet 650 mg ( Oral MAR Unhold 3/24/22 0113)   Pharmacy Consult Request ...Pain Management Review 1 Each ( Other MAR Unhold 3/24/22 0113)   oxyCODONE immediate-release (ROXICODONE) tablet 5 mg ( Oral MAR Unhold 3/24/22 0113)     Or   oxyCODONE immediate release (ROXICODONE) tablet 10 mg ( Oral MAR Unhold 3/24/22 0113)     Or   HYDROmorphone (Dilaudid) injection 0.5 mg ( Intravenous MAR Unhold 3/24/22 0113)   insulin regular (HumuLIN R,NovoLIN R) injection (2 Units Subcutaneous Given 3/24/22 1116)     And   dextrose 50% (D50W) injection 25 g ( Intravenous MAR Unhold 3/24/22 0113)   piperacillin-tazobactam (ZOSYN) 4.5 g in  mL IVPB (0 g Intravenous Stopped 3/24/22 0912)   lactated ringers infusion (0 mL Intravenous Stopped 3/24/22 0125)   Linezolid (ZYVOX) premix 600 mg (has no administration in time range)   NS (BOLUS) infusion 1,000 mL (1,000 mL Intravenous New Bag 3/23/22 1637)   clindamycin (CLEOCIN) IVPB premix 900 mg (0 mg Intravenous Stopped 3/23/22 1737)   piperacillin-tazobactam (ZOSYN) 4.5 g in  mL IVPB (0 g Intravenous Stopped 3/23/22 1707)   vancomycin 3000 mg/500mL NS IVPB premix (0 g Intravenous Stopped 3/23/22 1952)   HYDROmorphone (Dilaudid) injection 1 mg (1 mg Intravenous Given 3/23/22 1636)   iohexol (OMNIPAQUE) 350 mg/mL (IV) (100 mL Intravenous Given 3/23/22 1742)       FINAL IMPRESSION  1. Ludivina's gangrene    2. Sepsis without acute organ dysfunction, due to unspecified organism (HCC)    3. Hyponatremia    4. Scrotal swelling    5. Type 2 diabetes mellitus with hyperglycemia, without long-term current use of insulin (HCC)    6. Morbid obesity (HCC)    7. Dehydration        -ADMIT-      Pertinent Labs & Imaging studies reviewed and verified by myself, as well as nursing notes and the patient's past medical, family, and  social histories (See chart for details).    Portions of this record were made with voice recognition software.  Despite my review, spelling/grammar/context errors may still remain.  Interpretation of this chart should be taken in this context.    Electronically signed by Yovani Hernandez M.D. on 3/24/2022 at 12:02 PM.

## 2022-03-24 PROBLEM — D64.9 NORMOCHROMIC NORMOCYTIC ANEMIA: Status: ACTIVE | Noted: 2022-03-24

## 2022-03-24 PROBLEM — D69.6 THROMBOCYTOPENIA (HCC): Status: ACTIVE | Noted: 2022-03-24

## 2022-03-24 PROBLEM — E87.1 HYPONATREMIA: Status: ACTIVE | Noted: 2022-03-24

## 2022-03-24 LAB
ALBUMIN SERPL BCP-MCNC: 3.4 G/DL (ref 3.2–4.9)
ALBUMIN/GLOB SERPL: 1.1 G/DL
ALP SERPL-CCNC: 75 U/L (ref 30–99)
ALT SERPL-CCNC: 27 U/L (ref 2–50)
ANION GAP SERPL CALC-SCNC: 14 MMOL/L (ref 7–16)
AST SERPL-CCNC: 20 U/L (ref 12–45)
BASOPHILS # BLD AUTO: 0.4 % (ref 0–1.8)
BASOPHILS # BLD: 0.04 K/UL (ref 0–0.12)
BILIRUB SERPL-MCNC: 0.8 MG/DL (ref 0.1–1.5)
BUN SERPL-MCNC: 6 MG/DL (ref 8–22)
CALCIUM SERPL-MCNC: 8.6 MG/DL (ref 8.4–10.2)
CHLORIDE SERPL-SCNC: 99 MMOL/L (ref 96–112)
CO2 SERPL-SCNC: 19 MMOL/L (ref 20–33)
CREAT SERPL-MCNC: 0.51 MG/DL (ref 0.5–1.4)
EKG IMPRESSION: NORMAL
EOSINOPHIL # BLD AUTO: 0.01 K/UL (ref 0–0.51)
EOSINOPHIL NFR BLD: 0.1 % (ref 0–6.9)
ERYTHROCYTE [DISTWIDTH] IN BLOOD BY AUTOMATED COUNT: 38.7 FL (ref 35.9–50)
EST. AVERAGE GLUCOSE BLD GHB EST-MCNC: 255 MG/DL
GFR SERPLBLD CREATININE-BSD FMLA CKD-EPI: 139 ML/MIN/1.73 M 2
GLOBULIN SER CALC-MCNC: 3.1 G/DL (ref 1.9–3.5)
GLUCOSE BLD STRIP.AUTO-MCNC: 222 MG/DL (ref 65–99)
GLUCOSE BLD STRIP.AUTO-MCNC: 222 MG/DL (ref 65–99)
GLUCOSE BLD STRIP.AUTO-MCNC: 235 MG/DL (ref 65–99)
GLUCOSE BLD STRIP.AUTO-MCNC: 240 MG/DL (ref 65–99)
GLUCOSE SERPL-MCNC: 257 MG/DL (ref 65–99)
HBA1C MFR BLD: 10.5 % (ref 4–5.6)
HCT VFR BLD AUTO: 36.6 % (ref 42–52)
HGB BLD-MCNC: 12 G/DL (ref 14–18)
IMM GRANULOCYTES # BLD AUTO: 0.09 K/UL (ref 0–0.11)
IMM GRANULOCYTES NFR BLD AUTO: 0.9 % (ref 0–0.9)
LACTATE BLD-SCNC: 1.3 MMOL/L (ref 0.5–2)
LYMPHOCYTES # BLD AUTO: 0.83 K/UL (ref 1–4.8)
LYMPHOCYTES NFR BLD: 8 % (ref 22–41)
MCH RBC QN AUTO: 27.3 PG (ref 27–33)
MCHC RBC AUTO-ENTMCNC: 32.8 G/DL (ref 33.7–35.3)
MCV RBC AUTO: 83.4 FL (ref 81.4–97.8)
MONOCYTES # BLD AUTO: 0.76 K/UL (ref 0–0.85)
MONOCYTES NFR BLD AUTO: 7.3 % (ref 0–13.4)
NEUTROPHILS # BLD AUTO: 8.69 K/UL (ref 1.82–7.42)
NEUTROPHILS NFR BLD: 83.3 % (ref 44–72)
NRBC # BLD AUTO: 0 K/UL
NRBC BLD-RTO: 0 /100 WBC
PATHOLOGY CONSULT NOTE: NORMAL
PLATELET # BLD AUTO: 156 K/UL (ref 164–446)
PMV BLD AUTO: 11.7 FL (ref 9–12.9)
POTASSIUM SERPL-SCNC: 3.9 MMOL/L (ref 3.6–5.5)
PROT SERPL-MCNC: 6.5 G/DL (ref 6–8.2)
RBC # BLD AUTO: 4.39 M/UL (ref 4.7–6.1)
SODIUM SERPL-SCNC: 132 MMOL/L (ref 135–145)
WBC # BLD AUTO: 10.4 K/UL (ref 4.8–10.8)

## 2022-03-24 PROCEDURE — 700111 HCHG RX REV CODE 636 W/ 250 OVERRIDE (IP): Performed by: ANESTHESIOLOGY

## 2022-03-24 PROCEDURE — 80053 COMPREHEN METABOLIC PANEL: CPT

## 2022-03-24 PROCEDURE — 700105 HCHG RX REV CODE 258: Performed by: HOSPITALIST

## 2022-03-24 PROCEDURE — A9270 NON-COVERED ITEM OR SERVICE: HCPCS | Performed by: INTERNAL MEDICINE

## 2022-03-24 PROCEDURE — 700102 HCHG RX REV CODE 250 W/ 637 OVERRIDE(OP): Performed by: INTERNAL MEDICINE

## 2022-03-24 PROCEDURE — 85025 COMPLETE CBC W/AUTO DIFF WBC: CPT

## 2022-03-24 PROCEDURE — 36415 COLL VENOUS BLD VENIPUNCTURE: CPT

## 2022-03-24 PROCEDURE — 700111 HCHG RX REV CODE 636 W/ 250 OVERRIDE (IP): Performed by: INTERNAL MEDICINE

## 2022-03-24 PROCEDURE — 700111 HCHG RX REV CODE 636 W/ 250 OVERRIDE (IP): Performed by: HOSPITALIST

## 2022-03-24 PROCEDURE — 700105 HCHG RX REV CODE 258: Performed by: INTERNAL MEDICINE

## 2022-03-24 PROCEDURE — 770006 HCHG ROOM/CARE - MED/SURG/GYN SEMI*

## 2022-03-24 PROCEDURE — 83605 ASSAY OF LACTIC ACID: CPT

## 2022-03-24 PROCEDURE — 82962 GLUCOSE BLOOD TEST: CPT

## 2022-03-24 PROCEDURE — 0HBAXZZ EXCISION OF INGUINAL SKIN, EXTERNAL APPROACH: ICD-10-PCS | Performed by: UROLOGY

## 2022-03-24 PROCEDURE — 700101 HCHG RX REV CODE 250: Performed by: INTERNAL MEDICINE

## 2022-03-24 PROCEDURE — 99233 SBSQ HOSP IP/OBS HIGH 50: CPT | Performed by: HOSPITALIST

## 2022-03-24 RX ORDER — DIPHENHYDRAMINE HYDROCHLORIDE 50 MG/ML
12.5 INJECTION INTRAMUSCULAR; INTRAVENOUS
Status: DISCONTINUED | OUTPATIENT
Start: 2022-03-24 | End: 2022-03-24 | Stop reason: HOSPADM

## 2022-03-24 RX ORDER — OXYCODONE HCL 5 MG/5 ML
5 SOLUTION, ORAL ORAL
Status: DISCONTINUED | OUTPATIENT
Start: 2022-03-24 | End: 2022-03-24 | Stop reason: HOSPADM

## 2022-03-24 RX ORDER — HYDROMORPHONE HYDROCHLORIDE 1 MG/ML
0.4 INJECTION, SOLUTION INTRAMUSCULAR; INTRAVENOUS; SUBCUTANEOUS
Status: DISCONTINUED | OUTPATIENT
Start: 2022-03-24 | End: 2022-03-24 | Stop reason: HOSPADM

## 2022-03-24 RX ORDER — OXYCODONE HCL 5 MG/5 ML
10 SOLUTION, ORAL ORAL
Status: DISCONTINUED | OUTPATIENT
Start: 2022-03-24 | End: 2022-03-24 | Stop reason: HOSPADM

## 2022-03-24 RX ORDER — HYDROMORPHONE HYDROCHLORIDE 1 MG/ML
0.2 INJECTION, SOLUTION INTRAMUSCULAR; INTRAVENOUS; SUBCUTANEOUS
Status: DISCONTINUED | OUTPATIENT
Start: 2022-03-24 | End: 2022-03-24 | Stop reason: HOSPADM

## 2022-03-24 RX ORDER — HALOPERIDOL 5 MG/ML
1 INJECTION INTRAMUSCULAR
Status: DISCONTINUED | OUTPATIENT
Start: 2022-03-24 | End: 2022-03-24 | Stop reason: HOSPADM

## 2022-03-24 RX ORDER — SODIUM CHLORIDE, SODIUM LACTATE, POTASSIUM CHLORIDE, CALCIUM CHLORIDE 600; 310; 30; 20 MG/100ML; MG/100ML; MG/100ML; MG/100ML
INJECTION, SOLUTION INTRAVENOUS CONTINUOUS
Status: DISCONTINUED | OUTPATIENT
Start: 2022-03-24 | End: 2022-03-24 | Stop reason: HOSPADM

## 2022-03-24 RX ORDER — METOCLOPRAMIDE HYDROCHLORIDE 5 MG/ML
INJECTION INTRAMUSCULAR; INTRAVENOUS PRN
Status: DISCONTINUED | OUTPATIENT
Start: 2022-03-24 | End: 2022-03-24 | Stop reason: SURG

## 2022-03-24 RX ORDER — ONDANSETRON 2 MG/ML
4 INJECTION INTRAMUSCULAR; INTRAVENOUS
Status: DISCONTINUED | OUTPATIENT
Start: 2022-03-24 | End: 2022-03-24 | Stop reason: HOSPADM

## 2022-03-24 RX ORDER — LINEZOLID 2 MG/ML
600 INJECTION, SOLUTION INTRAVENOUS EVERY 12 HOURS
Status: DISCONTINUED | OUTPATIENT
Start: 2022-03-24 | End: 2022-03-26 | Stop reason: HOSPADM

## 2022-03-24 RX ORDER — HYDROMORPHONE HYDROCHLORIDE 1 MG/ML
0.1 INJECTION, SOLUTION INTRAMUSCULAR; INTRAVENOUS; SUBCUTANEOUS
Status: DISCONTINUED | OUTPATIENT
Start: 2022-03-24 | End: 2022-03-24 | Stop reason: HOSPADM

## 2022-03-24 RX ADMIN — OXYCODONE HYDROCHLORIDE 5 MG: 5 TABLET ORAL at 12:55

## 2022-03-24 RX ADMIN — VANCOMYCIN HYDROCHLORIDE 2000 MG: 1 INJECTION, POWDER, LYOPHILIZED, FOR SOLUTION INTRAVENOUS at 06:26

## 2022-03-24 RX ADMIN — INSULIN HUMAN 2 UNITS: 100 INJECTION, SOLUTION PARENTERAL at 05:43

## 2022-03-24 RX ADMIN — SODIUM CHLORIDE, POTASSIUM CHLORIDE, SODIUM LACTATE AND CALCIUM CHLORIDE: 600; 310; 30; 20 INJECTION, SOLUTION INTRAVENOUS at 06:26

## 2022-03-24 RX ADMIN — SENNOSIDES AND DOCUSATE SODIUM 2 TABLET: 50; 8.6 TABLET ORAL at 17:09

## 2022-03-24 RX ADMIN — AMLODIPINE BESYLATE 2.5 MG: 5 TABLET ORAL at 17:09

## 2022-03-24 RX ADMIN — INSULIN HUMAN 2 UNITS: 100 INJECTION, SOLUTION PARENTERAL at 17:10

## 2022-03-24 RX ADMIN — INSULIN HUMAN 2 UNITS: 100 INJECTION, SOLUTION PARENTERAL at 11:16

## 2022-03-24 RX ADMIN — LINEZOLID 600 MG: 600 INJECTION, SOLUTION INTRAVENOUS at 17:09

## 2022-03-24 RX ADMIN — SERTRALINE HYDROCHLORIDE 200 MG: 50 TABLET ORAL at 20:39

## 2022-03-24 RX ADMIN — PIPERACILLIN AND TAZOBACTAM 4.5 G: 4; .5 INJECTION, POWDER, LYOPHILIZED, FOR SOLUTION INTRAVENOUS; PARENTERAL at 05:12

## 2022-03-24 RX ADMIN — PIPERACILLIN AND TAZOBACTAM 4.5 G: 4; .5 INJECTION, POWDER, LYOPHILIZED, FOR SOLUTION INTRAVENOUS; PARENTERAL at 12:55

## 2022-03-24 RX ADMIN — CLINDAMYCIN IN 5 PERCENT DEXTROSE 900 MG: 18 INJECTION, SOLUTION INTRAVENOUS at 05:12

## 2022-03-24 RX ADMIN — METOCLOPRAMIDE 10 MG: 5 INJECTION, SOLUTION INTRAMUSCULAR; INTRAVENOUS at 00:00

## 2022-03-24 RX ADMIN — OXYCODONE HYDROCHLORIDE 10 MG: 10 TABLET ORAL at 20:39

## 2022-03-24 RX ADMIN — INSULIN HUMAN 2 UNITS: 100 INJECTION, SOLUTION PARENTERAL at 01:00

## 2022-03-24 RX ADMIN — SODIUM CHLORIDE, POTASSIUM CHLORIDE, SODIUM LACTATE AND CALCIUM CHLORIDE: 600; 310; 30; 20 INJECTION, SOLUTION INTRAVENOUS at 16:52

## 2022-03-24 RX ADMIN — PIPERACILLIN AND TAZOBACTAM 4.5 G: 4; .5 INJECTION, POWDER, LYOPHILIZED, FOR SOLUTION INTRAVENOUS; PARENTERAL at 20:40

## 2022-03-24 RX ADMIN — INSULIN HUMAN 2 UNITS: 100 INJECTION, SOLUTION PARENTERAL at 23:53

## 2022-03-24 RX ADMIN — OXYCODONE HYDROCHLORIDE 10 MG: 10 TABLET ORAL at 17:09

## 2022-03-24 ASSESSMENT — ENCOUNTER SYMPTOMS
VOMITING: 0
BLOOD IN STOOL: 0
CARDIOVASCULAR NEGATIVE: 1
HEMOPTYSIS: 0
HEADACHES: 0
PSYCHIATRIC NEGATIVE: 1
NERVOUS/ANXIOUS: 0
PALPITATIONS: 0
SEIZURES: 0
GASTROINTESTINAL NEGATIVE: 1
LOSS OF CONSCIOUSNESS: 0
FEVER: 0
FOCAL WEAKNESS: 0
DIZZINESS: 0
DOUBLE VISION: 0
WHEEZING: 0
FEVER: 1
COUGH: 0
NEUROLOGICAL NEGATIVE: 1
DIARRHEA: 0
RESPIRATORY NEGATIVE: 1
BRUISES/BLEEDS EASILY: 0
MUSCULOSKELETAL NEGATIVE: 1
ABDOMINAL PAIN: 0
CHILLS: 0
CONSTIPATION: 0
NAUSEA: 0
EYES NEGATIVE: 1
DIAPHORESIS: 0
HEARTBURN: 0

## 2022-03-24 ASSESSMENT — COGNITIVE AND FUNCTIONAL STATUS - GENERAL
DAILY ACTIVITIY SCORE: 24
SUGGESTED CMS G CODE MODIFIER MOBILITY: CH
SUGGESTED CMS G CODE MODIFIER DAILY ACTIVITY: CH
MOBILITY SCORE: 24

## 2022-03-24 ASSESSMENT — PAIN DESCRIPTION - PAIN TYPE
TYPE: SURGICAL PAIN
TYPE: ACUTE PAIN
TYPE: SURGICAL PAIN
TYPE: ACUTE PAIN

## 2022-03-24 NOTE — CARE PLAN
The patient is Stable - Low risk of patient condition declining or worsening    Shift Goals  Clinical Goals: Pain control 3/10  Patient Goals: Pain control, reduce edema    Progress made toward(s) clinical / shift goals:  Patient verbalizes understanding of plan of care. Addressed questions and concerns. Patient denies pain. Patient resting in bed.        Problem: Pain - Standard  Goal: Alleviation of pain or a reduction in pain to the patient’s comfort goal  Outcome: Progressing     Problem: Knowledge Deficit - Standard  Goal: Patient and family/care givers will demonstrate understanding of plan of care, disease process/condition, diagnostic tests and medications  Outcome: Progressing     Problem: Hemodynamics  Goal: Patient's hemodynamics, fluid balance and neurologic status will be stable or improve  Outcome: Progressing     Problem: Fluid Volume  Goal: Fluid volume balance will be maintained  Outcome: Progressing     Problem: Urinary - Renal Perfusion  Goal: Ability to achieve and maintain adequate renal perfusion and functioning will improve  Outcome: Progressing     Problem: Respiratory  Goal: Patient will achieve/maintain optimum respiratory ventilation and gas exchange  Outcome: Progressing

## 2022-03-24 NOTE — OR NURSING
Pt to preop accompanied by his parents. Procedure, allergies, medications and NPO status verified. LR infusion initiated. Pt belongings secured.

## 2022-03-24 NOTE — ANESTHESIA PREPROCEDURE EVALUATION
Case: [id number] Date/Time: 03/23/22 2242    Procedure: EXPLORATION, SCROTUM I&D SCROTAL ABSCESS    Location: [facility] OR 01 / SURGERY [facility]    Surgeons: Jonathan Osorio M.D.      Super morbid obese bmi 54  Scrotal abscess  Relevant Problems   ENDO   (positive) Type 2 diabetes mellitus with hyperglycemia, without long-term current use of insulin (HCC)       Physical Exam    Airway   Mallampati: II  TM distance: >3 FB  Neck ROM: full       Cardiovascular - normal exam  Rhythm: regular  Rate: normal  (-) murmur     Dental - normal exam        Facial Hair   Pulmonary - normal exam  Breath sounds clear to auscultation     Abdominal    Neurological - normal exam         Other findings: Thick neck            Anesthesia Plan    ASA 4- EMERGENT   ASA physical status 4 criteria: sepsisASA physical status emergent criteria: sepsis and acute deteriorating condition due to infection    Plan - general       Airway plan will be ETT    (Super morbid obese+ DM+sepsis)      Induction: intravenous    Postoperative Plan: Postoperative administration of opioids is intended.    Pertinent diagnostic labs and testing reviewed    Informed Consent:    Anesthetic plan and risks discussed with patient.    Use of blood products discussed with: patient whom consented to blood products.

## 2022-03-24 NOTE — ANESTHESIA TIME REPORT
Anesthesia Start and Stop Event Times     Date Time Event    3/23/2022 2325 Anesthesia Start    3/24/2022 0028 Anesthesia Stop        Responsible Staff  03/23/22 to 03/24/22    Name Role Begin End    Benjamin Elena M.D. Anesth 2325 0028        Preop Diagnosis (Free Text):  Pre-op Diagnosis     drainage spontaneously assopciated erythema        Preop Diagnosis (Codes):    Premium Reason  B. 1st Call    Comments:

## 2022-03-24 NOTE — ASSESSMENT & PLAN NOTE
Body mass index is 54.93 kg/m².  Outpatient weight loss management program and lifestyle modification highly recommended

## 2022-03-24 NOTE — NON-PROVIDER
Mao URBAN [id number]    CC: patient presented to ER on  03/23 with scrotal swelling and pressure like pain.    HPI: patient is a 31y/o male presented to ER on 3/23/2022 with scrotal swelling and throbbing pain, not radiating.  Patient with a past medical history significant for hydrocole corrected in 2010, DM-Type 2 and HTN. patient scrotal swelling and pain Swelling and pain started 2 weeks ago with fever of 101 and chills for a day or two. Patient Randal pain with urination, abnormal discharge,urinary retention,  [place], vomiting, chest pain or abdominal pain.  patient went to [facility] and they referred him to [facility] [place] where they started him on ABX Levaquin, patient scrotal doubled in size in the last 3 days after started ABX, patient  noticed a bump on his left scrotal with  Redness.    Medication: Lisinopril 5 mg once daily, Amlodipine 2.5 mg once daily, metformin 1000 mg once daily, glyburide 1.5 mg twice daily  Allergy: Evita    Past Medical Hx: DM-type 2, HTN    Past surgical Hx: gastric sleeve 2018, hydrocele repair 2010, inguinal hernia repair 2009, 2008 B/L, liver biopsy 2018, tonsillectomy 2004, eye surgery due to dog bite 1995.    Social Hx:: Patient works as a , living by himself, never a smoker,  no alcohol use.     ROS:  Constitutional : patient in no distress,   HENT: negative for caught, sore throat and congistion.  Resp: Negative SOB, chest pain.  Cardio: no chest pain, palpation,   Gastro: patient randal Abd pain and nausea, vomiting or diarrhea  Musck: denies weakness back pain,  or falls.  Genitourinary: significant scrotal swelling, and pressure pain.  Neuro denies dizziness, loss of consciousness and focal weakness         Vitals: /61   Pulse (!) 111   Temp 37.7 °C (99.8 °F) (Oral)   Resp 18   Ht 1.829 m (6')   Wt (!) 184 kg (405 lb)   SpO2 90%    Lab Results   Component Value Date/Time    SODIUM 132 (L) 03/24/2022 02:14 AM    POTASSIUM 3.9 03/24/2022  02:14 AM    CHLORIDE 99 03/24/2022 02:14 AM    CO2 19 (L) 03/24/2022 02:14 AM    GLUCOSE 257 (H) 03/24/2022 02:14 AM    BUN 6 (L) 03/24/2022 02:14 AM    CREATININE 0.51 03/24/2022 02:14 AM      Lab Results   Component Value Date/Time    WBC 10.4 03/24/2022 02:14 AM    RBC 4.39 (L) 03/24/2022 02:14 AM    HEMOGLOBIN 12.0 (L) 03/24/2022 02:14 AM    HEMATOCRIT 36.6 (L) 03/24/2022 02:14 AM    MCV 83.4 03/24/2022 02:14 AM    MCH 27.3 03/24/2022 02:14 AM    MCHC 32.8 (L) 03/24/2022 02:14 AM    MPV 11.7 03/24/2022 02:14 AM    NEUTSPOLYS 83.30 (H) 03/24/2022 02:14 AM    LYMPHOCYTES 8.00 (L) 03/24/2022 02:14 AM    MONOCYTES 7.30 03/24/2022 02:14 AM    EOSINOPHILS 0.10 03/24/2022 02:14 AM    BASOPHILS 0.40 03/24/2022 02:14 AM      Physical exam:   Constitutional: Sitting up in no distress. Obese with a BMI of 54.93  HEENT: Normocephalic, atraumatic.,mouth mucous membranes dry.  Eyes:  Normal sclerae.  Neck: Supple, non tender, no lymphadenopathy   Chest/Pulmonary: lung sounds vesicular bilaterally,  no wheezes or rhonchi.  Cardiovascular: Tachycardic, regular rhythm, no murmur.  Abdomen: Soft, nontender; no rebound or garding   : significant scrotal  swollen with erythema erythematous bilaterally,  non tender, warm. and left scrotum has a small lesion with  drainage.  Musculoskeletal: No weakness, no extremity edema.  Neuro: Clear speech, alert, no focal weakness or asymmetry.  Psych: Normal mood and affect.  Skin: Scrotal erythema present, warm and dry.    Assessment and plan:  1- scrotal swelling and pain:    Pelvis CT, consult urology, keep him NPO until cleared by urology, pain management as needed, lesion culture, blood culture IV - ABX  pipercillien-tazobactam Prophylactic, adjust Abx after culture sensitivity, urine analysis.  2- DM management:    Glucose level on admission 285   Order A1c, point of care insulin check, adjust insulin  sliding scale as needed.  3- Sepsis:   Patient Temp 101, tachycardia,  leukocytosis,lesion on scrotum.   Iv- antibiotic ceftriaxone, Iv- fluid.  4- DVT prophylaxis:

## 2022-03-24 NOTE — OR SURGEON
Immediate Post OP Note    PreOp Diagnosis: Left scrotal abscess                                 PostOp Diagnosis: As abscess      Procedure(s):  EXAM UNDER ANESTHESIA  LEFT SCROTAL EXPLORATION  I &D SCROTAL ABSCESS - Wound Class: Dirty or Infected    Surgeon(s):  Jonathan Osorio M.D.    Anesthesiologist/Type of Anesthesia:  Anesthesiologist: Benjamin Elena M.D./General ETT    Surgical Staff:  Circulator: Bulmaro Jerome R.N.  Scrub Person: Piotr Ennis    Specimens removed if any:  ID Type Source Tests Collected by Time Destination   1 : Left Scrotal Abccess Body Fluid Scrotum AEROBIC/ANAEROBIC CULTURE (SURGERY) Jonathan Osorio M.D. 3/23/2022 11:45 PM    A : Left Scrotal Tissue Tissue Scrotum PATHOLOGY SPECIMEN Jonathan Osorio M.D. 3/23/2022 11:44 PM        Estimated Blood Loss: 50ml    Findings: 3cm x 3cm superficial abscess with depth of 1.5cm    Complications: None  Drains        3/24/2022 12:26 AM Jonathan Osorio M.D.

## 2022-03-24 NOTE — PROGRESS NOTES
Note to reader: this note follows the APSO format rather than the historical SOAP format. Assessment and plan located at the top of the note for ease of use.    Chief Complaint  32 y.o. year old male here with scrotal abscess    Assessment/Plan  Interval History   Scrotal abscess  Scrotal edema  Hydroceles  Obesity  Diabetes      Wound care consult for packing changes-OK to change today with saline wet to dry  Please find means to secure gauze and elevate scrotum  Antibiotics per Hospital team      Case discussed with patient, RN, Hospitalist and Urology-Dr. Devon LEON  Patient seen and examined    3/24. POD #1. Doing well. Minimal pain. On Zosyn and Zyvox pending cultures. WBC normalized. T Max 101.           Review of Systems  Physical Exam   Review of Systems   Constitutional: Negative for fever.   Genitourinary:        Mild scrotal discomfort     Vitals:    03/24/22 0317 03/24/22 0332 03/24/22 0511 03/24/22 0700   BP: 134/78  151/72    Pulse: 100 98 (!) 108    Resp: 20  18    Temp: 37.2 °C (98.9 °F)  37.6 °C (99.7 °F)    TempSrc: Oral  Oral    SpO2: 97% 98% 98% 91%   Weight:       Height:         Physical Exam  Vitals and nursing note reviewed.   Pulmonary:      Effort: Pulmonary effort is normal.   Genitourinary:     Comments: Massive scrotal edema with overlying redness. I & D site left inferior scrotum with gauze in place. No purulent drainage, no tenderness         Hematology Chemistry   Lab Results   Component Value Date/Time    WBC 10.4 03/24/2022 02:14 AM    HEMOGLOBIN 12.0 (L) 03/24/2022 02:14 AM    HEMATOCRIT 36.6 (L) 03/24/2022 02:14 AM    PLATELETCT 156 (L) 03/24/2022 02:14 AM     Lab Results   Component Value Date/Time    SODIUM 132 (L) 03/24/2022 02:14 AM    POTASSIUM 3.9 03/24/2022 02:14 AM    CHLORIDE 99 03/24/2022 02:14 AM    CO2 19 (L) 03/24/2022 02:14 AM    GLUCOSE 257 (H) 03/24/2022 02:14 AM    BUN 6 (L) 03/24/2022 02:14 AM    CREATININE 0.51 03/24/2022 02:14 AM         Labs not explicitly  included in this progress note were reviewed by the author.   Radiology/imaging not explicitly included in this progress note was reviewed by the author.     Medications reviewed and Labs reviewed

## 2022-03-24 NOTE — OR NURSING
0017: To PACU from OR via bed, respirations spontaneous, tachypneic, pt rouses to name and denies pain/nausea. Scrotal support tucked to L side of scrotum and holding surgical dressings in place as placed by surgeon. Scrotum edematous. Surgeon states he will address wound needs with wound team.  0030: Rouses easily, dozes when not stimulated.  0032: Anesthesia updated hospitalist with pt's tachpnea, tachycardia and Temp - hospitalist will put in orders, pt to go back to floor.   0045: no change  0047: FSBS 240,  Given insulin per sliding scale  0054: tolerates po water  0100: No change in surgical site assessment. Meets criteria to transfer to floor   0105: Transferred on O2 tank @ FULL

## 2022-03-24 NOTE — ASSESSMENT & PLAN NOTE
Extensive discussion held with patient regarding diabetic management  Initiate Januvia and continue with Metformin and glyburide and continue with sliding scale coverage  Most recent hemoglobin A1c is 10.5

## 2022-03-24 NOTE — DIETARY
NUTRITION SERVICES: BMI - Pt with BMI >40 (=Body mass index is 54.93 kg/m².), Class III obesity. BMI based on stated admit wt of 405 lbs. Weight loss counseling not appropriate in acute care setting.     RECOMMEND - If appropriate at DC please refer to outpatient nutrition services for weight management.

## 2022-03-24 NOTE — PROGRESS NOTES
Received report from NOC RN.  Assumed patient care at 0700.  Assisted patient up to restroom.  Back to bed safely.  Scrotal swelling with left incision noted.  Packing in place, changed gauze dressing. Patient still NPO notified MD for updates and orders. Monitor tech notified this RN HR sustaining in 150 when up to bathroom.  Patient back in bed -119.

## 2022-03-24 NOTE — ASSESSMENT & PLAN NOTE
Scrotal swelling has improved  No drainage at this point  Cultures pending  Continue with antibiotics

## 2022-03-24 NOTE — CONSULTS
DATE OF SERVICE:  03/23/2022     UROLOGIC CONSULTATION     PRIMARY CARE PHYSICIAN:  Lele Middleton MD     CHIEF COMPLAINT:  Groin pain for 2 weeks, having been prescribed antibiotics   without relief.     HISTORY OF PRESENT ILLNESS:  The patient states that 2 weeks ago, he developed   some swelling of the scrotum and redness, it was on both sides but   predominantly the left and he was seen in the emergency room in [facility] and started on Levaquin, but the patient was referred to our office,   where he was seen and evaluated, had a small area of open wound on the left   side with eschar.  There was evidence of erythema and swelling and as such, he   was recommended to go to the emergency room for admission, IV antibiotics and   possible incision and drainage.  Of note, he has been on Levaquin for 48   hours and he has had increasing swelling and there is a small eschar on his   left hemiscrotum.     PAST MEDICAL HISTORY:  Noteworthy for diabetes, hypertension, snoring and   anxiety.     PAST SURGICAL HISTORY:  He has had a hydrocelectomy in 2010, bilateral hernia   repairs in the past, tonsillectomy, gastric sleeve laparoscopy in 2018 and a   liver biopsy in 2018.     OUTPATIENT MEDICATIONS:  Included Levaquin.     ALLERGIES:  TO CEFACLOR.     SOCIAL HISTORY:  He is nonsmoker, nondrinker, does not use drugs.     FAMILY HISTORY:  Noncontributory.     PHYSICAL EXAMINATION:    GENERAL:  This is a well-developed, well-nourished, overweight male in no   acute distress.  HEENT:  Normocephalic, atraumatic.  Pupils equal, round.  Sclerae nonicteric.  NECK:  Supple.  CHEST:  Clear bilaterally.  CARDIOVASCULAR:  Regular rate and rhythm without murmur.  ABDOMEN:  Morbidly obese.  GENITOURINARY:  Shows significant swelling of both genitalia and there is an   open wound on the left lateral scrotum, which is approximately 3 cm in size.    There is some debris present and associated cellulitis.  There is slight    fluctuance above this and exam is consistent with a superficial localized   abscess.  Perineum is normal.  Digital rectal exam is deferred.  NEUROLOGIC:  Cranial nerves II-XII intact.  Motor and sensory examination is   nonfocal.     LABORATORY DATA:  The patient had a CAT scan, which shows no evidence of air   in the scrotum.  There are bilateral hydroceles and significant swelling   slightly left greater than right.  He has a white blood cell count of 12,100.    His blood sugar is 285.     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     PLAN AND RECOMMENDATION:  Despite the CT not showing any crepitus or air in   the scrotum, the clinical findings are significant for drainage spontaneously   associated erythema and I have recommended incision and drainage under general   anesthesia with packing of the wound.  Prior to surgery, I discussed with the   patient the risk of procedure including but not limited to the potential risk   of further infection requiring secondary procedures.  There is risk of the   infection being much more involved, involving the left hemiscrotum requiring   exploration and certainly if there is any nonviable necrotic debris, it would   be removed.  I explained to the patient the likelihood of orchiectomy is   extremely small in this setting but that emergent debridement is advised.  I   have also discussed the perioperative risk of deep vein thrombosis, pulmonary   embolism, aspiration pneumonia, heart attack, stroke and death.  Informed   consent was given to me by the patient to proceed.        ______________________________  MD ANASTACIA Mauricio/NIK    DD:  03/23/2022 22:54  DT:  03/23/2022 23:08    Job#:  [id number]

## 2022-03-24 NOTE — ANESTHESIA TIME REPORT
Anesthesia Start and Stop Event Times     Date Time Event    3/23/2022 2325 Anesthesia Start        Responsible Staff  03/23/22    Name Role Begin End    Bejnamin Elena M.D. Anesth 2325         Preop Diagnosis (Free Text):  Pre-op Diagnosis     drainage spontaneously assopciated erythema        Preop Diagnosis (Codes):    Premium Reason  B. 1st Call    Comments:

## 2022-03-24 NOTE — PROGRESS NOTES
Received bedside patient report from day shift RN. Patient resting in bed, no complaints at this time. Patient is awake, alert, calm, A&Ox4. Patient denies pain. Safety precautions in place.

## 2022-03-24 NOTE — PROGRESS NOTES
"Pharmacy Vancomycin Kinetics Note for 3/23/2022     32 y.o. male on Vancomycin day # 1     Vancomycin Indication (AUC Dosing): Skin/skin structure infection  Vancomycin Indication (Two level/Trough based Dosing):      Provider specified end date: 03/30/22    Active Antibiotics (From admission, onward)    Ordered     Ordering Provider       Wed Mar 23, 2022  9:09 PM    03/23/22 2109  MD Alert...Vancomycin per Pharmacy  PHARMACY TO DOSE        Question:  Indication(s) for vancomycin?  Answer:  Skin and soft tissue infection    Rosa M Balderas M.D.       Wed Mar 23, 2022  7:00 PM    03/23/22 1900  piperacillin-tazobactam (ZOSYN) 4.5 g in  mL IVPB  (piperacillin-tazobactam (ZOSYN) IV (Extended-infusion) PANEL )  EVERY 8 HOURS         Rosa M Balderas M.D.       Wed Mar 23, 2022  6:55 PM    03/23/22 1855  clindamycin (CLEOCIN) IVPB premix 900 mg  EVERY 8 HOURS         Rosa M Balderas M.D.          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 At that time he was placed on Levaquin, but continued to have increased scrotal pain and swelling.  CT showing large bilateral hydroceles but no fluid collections or gas. Taken to surgery on 3/23.    Allergies:     Ceclor [cefaclor]     Pertinent cultures to date:     Results     Procedure Component Value Units Date/Time    Blood Culture [[id number]] Collected: 03/23/22 1621    Order Status: Sent Specimen: Blood from Peripheral Updated: 03/23/22 1628    Narrative:      2 of 2 blood culture x2  Sites order. Per Hospital Policy:  Only change Specimen Src: to \"Line\" if specified by physician  order.    Blood Culture [[id number]] Collected: 03/23/22 1621    Order Status: Sent Specimen: Blood from Peripheral Updated: 03/23/22 1627    Narrative:      1 of 2 for Blood Culture x 2 sites order. Per " "Hospital  Policy: Only change Specimen Src: to \"Line\" if specified by  physician order.    Urinalysis [[id number]]     Order Status: Sent Specimen: Urine     Urine Culture (NEW) [[id number]]     Order Status: Sent Specimen: Urine           Labs:     Estimated Creatinine Clearance: 297.8 mL/min (by C-G formula based on SCr of 0.61 mg/dL).  Recent Labs     22  1626   WBC 12.1*   NEUTSPOLYS 78.60*     Recent Labs     22  1626   BUN 7*   CREATININE 0.61   ALBUMIN 4.0     No intake or output data in the 24 hours ending 22 2114   BP (!) 170/80   Pulse (!) 113   Temp 37.3 °C (99.2 °F) (Temporal)   Resp 20   Ht 1.829 m (6')   Wt (!) 184 kg (405 lb)   SpO2 93%  Temp (24hrs), Av.3 °C (99.2 °F), Min:37.3 °C (99.2 °F), Max:37.3 °C (99.2 °F)      List concerns for Vancomycin clearance:     Obesity    Pharmacokinetics:    AUC kinetics:   Ke (hr ^-1): 0.2509 hr^-1  Half life: 2.76 hr  Clearance: 9.101  Estimated TDD: 4550.5  Estimated Dose: 910  Estimated interval: 4.8    A/P:     -  Vancomycin dose: Given 3 g loading dose of Vancomycin IV in the ER. Will start Vancomycin 2 g IV q12 hours at 0500 on 3/24/22.      -  Next vancomycin level(s): None ordered. Will be ordered by daytime clinical pharmacist once at steady state.       -  Predicted vancomycin AUC from initial AUC test calculator: 440 mg·hr/L    -  Comments: Awaiting cultures from surgery. Will de-escalate as appropriate.     Nisha Barragan, PharmD  "

## 2022-03-24 NOTE — ANESTHESIA POSTPROCEDURE EVALUATION
I spoke with hospitalist, Dr. Hsieh, about patient, so he is aware of his condition.      Patient: Ga Mora    Procedure Summary     Date: 03/23/22 Room / Location: [place] [id number] / SURGERY [facility]    Anesthesia Start: 2325 Anesthesia Stop: 03/24/22 0028    Procedure: EXPLORATION, SCROTUM I&D SCROTAL ABSCESS (Left Scrotum) Diagnosis: (drainage spontaneously assopciated erythema)    Surgeons: Jonathan Osorio M.D. Responsible Provider: Benjamin Elena M.D.    Anesthesia Type: general ASA Status: 4 - Emergent          Final Anesthesia Type: general  Last vitals  BP   Blood Pressure: 151/72, NIBP: 146/75    Temp   37.6 °C (99.7 °F)    Pulse   (!) 108   Resp   18    SpO2   91 %      Anesthesia Post Evaluation    Patient location during evaluation: PACU  Patient participation: complete - patient participated  Level of consciousness: awake and alert    Airway patency: patent  Anesthetic complications: no  Cardiovascular status: hemodynamically stable  Respiratory status: acceptable  Hydration status: euvolemic    PONV: none          No complications documented.     Nurse Pain Score: 0 (NPRS)

## 2022-03-24 NOTE — PROGRESS NOTES
[facility] Medicine Daily Progress Note    Date of Service  3/24/2022    Chief Complaint  Ga Mora is a 32 y.o. 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  The patient thus went to see urology in [place].  Urology at this point recommended the patient immediately come to the emergency room for evaluation.  Patient was thus admitted and then underwent urological incision and drainage.  Patient had minimal drainage that was able to be obtained.  The swelling seems to be localized more to the subcutaneous tissue and the skin.  The patient at this point initially was placed on clindamycin vancomycin and Zosyn.  After discussing with pharmacy we are de-escalating to Zyvox and Zosyn.  Follow culture, pain management.    Interval Problem Update  Continue at this point day 2 of antibiotics with Zosyn and Zyvox  Postoperative day #1  Cultures pending  Pain management  Diabetic management  Education into diabetic management as the patient's hemoglobin A1c is very poorly controlled  Blood pressure management    I have personally seen and examined the patient at bedside. I discussed the plan of care with patient, bedside RN, charge RN,  and pharmacy.    Consultants/Specialty  urology    Code Status  Full Code    Disposition  Patient is not medically cleared for discharge.   Anticipate discharge to to home with close outpatient follow-up.  I have placed the appropriate orders for post-discharge needs.    Review of Systems  Review of Systems   Constitutional: Positive for fever. Negative for chills and diaphoresis.   HENT: Negative.    Eyes: Negative.  Negative for double vision.   Respiratory: Negative.  Negative for cough, hemoptysis and wheezing.    Cardiovascular: Negative.  Negative for chest pain, palpitations and leg swelling.   Gastrointestinal: Negative.   Negative for abdominal pain, blood in stool, constipation, diarrhea, heartburn, nausea and vomiting.   Genitourinary: Negative for frequency, hematuria and urgency.        Scrotal swelling and pain   Musculoskeletal: Negative.  Negative for joint pain.   Skin: Negative.  Negative for itching and rash.   Neurological: Negative.  Negative for dizziness, focal weakness, seizures, loss of consciousness and headaches.   Endo/Heme/Allergies: Negative.  Does not bruise/bleed easily.   Psychiatric/Behavioral: Negative.  Negative for suicidal ideas. The patient is not nervous/anxious.    All other systems reviewed and are negative.       Physical Exam  Temp:  [36.7 °C (98.1 °F)-38.3 °C (101 °F)] 37.4 °C (99.3 °F)  Pulse:  [] 109  Resp:  [18-36] 18  BP: (131-170)/(65-97) 131/65  SpO2:  [91 %-98 %] 91 %    Physical Exam  Vitals and nursing note reviewed. Exam conducted with a chaperone present.   Constitutional:       General: He is not in acute distress.     Appearance: Normal appearance. He is well-developed. He is obese. He is ill-appearing. He is not toxic-appearing or diaphoretic.   HENT:      Head: Normocephalic and atraumatic.      Right Ear: External ear normal.      Left Ear: External ear normal.      Nose: Nose normal.      Mouth/Throat:      Mouth: Mucous membranes are moist.      Pharynx: Oropharynx is clear.   Eyes:      Extraocular Movements: Extraocular movements intact.      Conjunctiva/sclera: Conjunctivae normal.      Pupils: Pupils are equal, round, and reactive to light.   Neck:      Thyroid: No thyromegaly.      Vascular: No JVD.   Cardiovascular:      Rate and Rhythm: Normal rate and regular rhythm.      Pulses: Normal pulses.      Heart sounds: Normal heart sounds.   Pulmonary:      Effort: Pulmonary effort is normal.      Breath sounds: Normal breath sounds.   Chest:      Chest wall: No tenderness.   Abdominal:      General: Abdomen is flat. Bowel sounds are normal. There is no distension.       Palpations: Abdomen is soft. There is no mass.      Tenderness: There is no abdominal tenderness. There is no guarding or rebound.   Genitourinary:     Penis: Swelling present.       Testes:         Right: Tenderness and swelling present.         Left: Tenderness and swelling present.      Comments: Bilateral swelling and diffuse tenderness and redness of both scrotal sacs  Musculoskeletal:         General: Normal range of motion.      Cervical back: Normal range of motion and neck supple. No tenderness.      Right lower leg: No edema.   Lymphadenopathy:      Cervical: No cervical adenopathy.   Skin:     General: Skin is warm and dry.      Capillary Refill: Capillary refill takes less than 2 seconds.      Findings: No rash.   Neurological:      General: No focal deficit present.      Mental Status: He is alert and oriented to person, place, and time. Mental status is at baseline.      GCS: GCS eye subscore is 4. GCS verbal subscore is 5. GCS motor subscore is 6.      Cranial Nerves: No cranial nerve deficit.      Gait: Gait abnormal.      Deep Tendon Reflexes: Reflexes are normal and symmetric.   Psychiatric:         Mood and Affect: Mood normal.         Behavior: Behavior normal.         Thought Content: Thought content normal.         Judgment: Judgment normal.         Fluids    Intake/Output Summary (Last 24 hours) at 3/24/2022 1145  Last data filed at 3/24/2022 0826  Gross per 24 hour   Intake 3133 ml   Output 1400 ml   Net 1733 ml       Laboratory  Recent Labs     03/23/22  1626 03/24/22  0214   WBC 12.1* 10.4   RBC 4.96 4.39*   HEMOGLOBIN 13.8* 12.0*   HEMATOCRIT 41.0* 36.6*   MCV 82.7 83.4   MCH 27.8 27.3   MCHC 33.7 32.8*   RDW 37.4 38.7   PLATELETCT 190 156*   MPV 11.3 11.7     Recent Labs     03/23/22  1626 03/24/22  0214   SODIUM 134* 132*   POTASSIUM 3.7 3.9   CHLORIDE 99 99   CO2 19* 19*   GLUCOSE 285* 257*   BUN 7* 6*   CREATININE 0.61 0.51   CALCIUM 9.4 8.6     Recent Labs     03/23/22  1626   APTT  28.3   INR 1.11               Imaging  CT-PELVIS WITH   Final Result      1.  There are bilateral hydroceles with associated fluid in the surrounding perineum. There is no enhancing fluid collection or gas within this fluid to suggest focal abscess.   2.  There are small reactive lymph nodes in the inguinal regions.   3.  There is no evidence of a perirectal or perianal abscess.      DX-CHEST-PORTABLE (1 VIEW)   Final Result      No acute cardiopulmonary process is seen.           Assessment/Plan  * Scrotal swelling- (present on admission)  Assessment & Plan  Patient for several days was having scrotal discomfort.  Over the past 48 hours however the scrotal swelling became extreme and the patient's scrotum became the size of a cantaloupe.  Patient underwent surgical debridement yesterday with minimal liquid evacuated.  No drain was placed  At this point urology recommends continuation of antibiotics and elevation of the scrotum.  Patient will not be able to the discharge home until his scrotal swelling decreases and is cultures are back.    Sepsis ([facility])  Assessment & Plan  Upon initial presentation patient was septic  Reevaluation patient is no longer septic  Afebrile, white blood cell count has come down, lactic acid negative, cultures pending, continue with Zosyn and Zyvox    Hyponatremia  Assessment & Plan  Hyponatremia at 132  Give fluid resuscitation monitor sodium levels    Type 2 diabetes mellitus with hyperglycemia, without long-term current use of insulin ([facility])  Assessment & Plan  -accus with sliding scale coverage  -diabetic diet  -diabetic education  -follow glycohemoglobin levels long term, very poor long-term control most recent hemoglobin A1c is 10.5  -As an outpatient patient was on glyburide and Metformin, these were held for surgery  -monitor for hypoglycemic episodes and adjust control if he should get low    Thrombocytopenia ([facility])  Assessment & Plan  Mild thrombocytopenia  Currently no  bleeding      Normochromic normocytic anemia  Assessment & Plan  Monitor H&H if drops below 7 or 21 transfuse  Check B12 and iron panel.    Morbid obesity (HCC)  Assessment & Plan  Body mass index is 54.93 kg/m².  Outpatient weight loss management program and lifestyle modification highly recommended       VTE prophylaxis: SCDs/TEDs    I have performed a physical exam and reviewed and updated ROS and Plan today (3/24/2022). In review of yesterday's note (3/23/2022), there are no changes except as documented above.

## 2022-03-24 NOTE — PROGRESS NOTES
Telemetry Shift Summary    Rhythm SR-ST  HR Range   Ectopy none  Measurements 0.16/0.10/0.30        Normal Values  Rhythm SR  HR Range    Measurements 0.12-0.20 / 0.06-0.10  / 0.30-0.52

## 2022-03-24 NOTE — THERAPY
Missed Therapy     Patient Name: Ga Mora  Age:  32 y.o., Sex:  male  Medical Record #: [id number]  Today's Date: 3/24/2022    Discussed missed therapy with RN       03/24/22 0814   Interdisciplinary Plan of Care Collaboration   IDT Collaboration with  Nursing   Patient Position at End of Therapy In Bed;Call Light within Reach;Phone within Reach;Tray Table within Reach   Collaboration Comments No PT eval indicated at this time. Pt states of no concerns and has been mobilizing independently in his room. Will cancel therapy orders at this time.

## 2022-03-24 NOTE — CARE PLAN
The patient is Stable - Low risk of patient condition declining or worsening    Shift Goals  Clinical Goals: wound care, urology evaluation, infection control, pain control  Patient Goals: pain control  Family Goals: NA    Progress made toward(s) clinical / shift goals:   Pain controlled with current medications, IV abx,   Patient is not progressing towards the following goals: Wound care RN to evaluate patient in AM.       Problem: Pain - Standard  Goal: Alleviation of pain or a reduction in pain to the patient’s comfort goal  Outcome: Progressing     Problem: Knowledge Deficit - Standard  Goal: Patient and family/care givers will demonstrate understanding of plan of care, disease process/condition, diagnostic tests and medications  Outcome: Progressing     Problem: Fluid Volume  Goal: Fluid volume balance will be maintained  Outcome: Progressing     Problem: Urinary - Renal Perfusion  Goal: Ability to achieve and maintain adequate renal perfusion and functioning will improve  Outcome: Progressing     Problem: Respiratory  Goal: Patient will achieve/maintain optimum respiratory ventilation and gas exchange  Outcome: Progressing

## 2022-03-24 NOTE — THERAPY
Occupational Therapy     Patient Name: Ga Mora  Age:  32 y.o., Sex:  male  Medical Record #: [id number]  Today's Date: 3/24/2022    Discussed missed therapy with RN       03/24/22 0816   Interdisciplinary Plan of Care Collaboration   Collaboration Comments OT orders rec'd.  Per PT and RN, pt up indep in room, able to perform ADl's.  No apparent need for OT intervention at this time, will cancel OT orders.

## 2022-03-24 NOTE — H&P
"Hospital Medicine History & Physical Note    Date of Service  3/23/2022    Primary Care Physician  Lele Middleton M.D.    Consultants  urology    Specialist Names: Dr. Osorio    Code Status  Full Code    Chief Complaint  Chief Complaint   Patient presents with   • Groin Pain     Started about two weeks ago, has been taking antibx without relief       History of Presenting Illness  Ga Mora is a 32 y.o. male who presented 3/23/2022 with scrotal swelling and pain.    Patient was in his usual state of health until 3/1/22 had fever, one day, then was fine.  He had no other symptoms.  Then Friday 3/18/2022 developed scrotal swelling, Monday 3/21/2022 went to ER in [place], was treated with levaquin and discharged, Tuesday 3/22 swelling worsened.  Today pain and swelling worsened so got into [facility], they saw him in clinic and sent him directly over to [facility].  Never had this happen before. Had a hydrocele in 2010, had surgery.  Did well since.  Last year, went to urology since one testicle felt \"heavy\" but said everything was yane.  Sugars been 200s lately.      In the ER he had a temp of 37.3C, tachycardic to the 120s, hypertensive to the 160s-170s, normal oxygen saturation on room air.  Labs markable for WBC 12, hemoglobin 13.8, ESR 46, sodium 134, bicarb 19, anion gap 16, glucose 285, procalcitonin 0.19, CRP 22.  He was given IV fluids and started on vancomycin, clindamycin and Zosyn.  Urology planning to take him to surgery tonight.  Did end up having a CT pelvis with contrast which showed bilateral hydroceles with associated fluid in the surrounding perineum, no enhancing fluid collection or gas within the fluid to suggest focal abscess, small reactive lymph nodes in the inguinal regions, no evidence of perirectal or perianal abscess.    I discussed the plan of care with patient, family and bedside RN.    Review of Systems  Review of Systems   Constitutional: Positive for chills " and fever.   HENT: Negative for congestion and sore throat.    Eyes: Negative for redness.   Respiratory: Negative for shortness of breath.    Cardiovascular: Negative for chest pain.   Gastrointestinal: Negative for abdominal pain, nausea and vomiting.   Genitourinary:        Scrotal swelling and pain   Musculoskeletal: Negative.    Skin:        Lesion on testicle   Neurological: Negative for dizziness.   Psychiatric/Behavioral: Negative for substance abuse.       Past Medical History   has a past medical history of Anxiety, Diabetes (HCC), Hypertension, and Snoring.    Surgical History   has a past surgical history that includes hydrocelectomy adult (2010); inguinal hernia repair (Bilateral, 2008/2009); tonsillectomy (2004); eye surgery (1995); gastric sleeve laparoscopy (11/21/2018); and liver biopsy laparoscopic (11/21/2018).     Family History  family history is not on file.   Family history reviewed with patient. There is no family history that is pertinent to the chief complaint.     Social History   reports that he has never smoked. He has never used smokeless tobacco. He reports that he does not drink alcohol and does not use drugs.    Allergies  Allergies   Allergen Reactions   • Ceclor [Cefaclor] Rash     Childhood allergies when pt was 6 years old received a rash        Medications  Prior to Admission Medications   Prescriptions Last Dose Informant Patient Reported? Taking?   acetaminophen (TYLENOL) 500 MG Tab 3/23/2022 at 1000 Patient Yes Yes   Sig: Take 1,000 mg by mouth every 6 hours as needed for Moderate Pain.   amLODIPine (NORVASC) 2.5 MG Tab 3/21/2022 at PM Patient Yes No   Sig: Take 2.5 mg by mouth every evening. Indications: High Blood Pressure Disorder   glyBURIDE (DIABETA) 5 MG Tab 3/21/2022 at PM Patient Yes No   Sig: Take 10 mg by mouth 2 Times a Day.   ibuprofen (MOTRIN) 200 MG Tab 3/23/2022 at 1330 Patient Yes Yes   Sig: Take 600 mg by mouth every 6 hours as needed for Mild Pain.    levoFLOXacin (LEVAQUIN) 750 MG tablet 3/22/2022 at 1630 Patient Yes Yes   Sig: Take 750 mg by mouth every day. Pt started on 3/21/2022 for 10 day course   metFORMIN (GLUCOPHAGE) 500 MG Tab 3/21/2022 at PM Patient Yes No   Sig: Take 1,000 mg by mouth 2 times a day.   sertraline (ZOLOFT) 100 MG Tab 3/21/2022 at PM Patient Yes No   Sig: Take 200 mg by mouth every bedtime. anxiety      Facility-Administered Medications: None       Physical Exam  Temp:  [37.3 °C (99.2 °F)] 37.3 °C (99.2 °F)  Pulse:  [113-127] 113  Resp:  [20] 20  BP: (166-170)/(80-85) 170/80  SpO2:  [93 %-94 %] 93 %  Blood Pressure: (!) 170/80   Temperature: 37.3 °C (99.2 °F)   Pulse: (!) 113   Respiration: 20   Pulse Oximetry: 93 %       Physical Exam  Constitutional:       General: He is not in acute distress.     Appearance: He is obese. He is not ill-appearing, toxic-appearing or diaphoretic.   HENT:      Head: Normocephalic and atraumatic.      Nose: Nose normal.      Mouth/Throat:      Mouth: Mucous membranes are moist.      Pharynx: Oropharynx is clear.   Eyes:      General: No scleral icterus.     Conjunctiva/sclera: Conjunctivae normal.   Cardiovascular:      Rate and Rhythm: Regular rhythm. Tachycardia present.      Heart sounds: No murmur heard.    No friction rub. No gallop.   Pulmonary:      Effort: Pulmonary effort is normal.      Breath sounds: Normal breath sounds.   Abdominal:      General: Bowel sounds are normal. There is no distension.      Palpations: Abdomen is soft.      Tenderness: There is no abdominal tenderness.   Genitourinary:     Comments: Severely enlarged, swollen, erythematous scrotum, on left side small 1cm necrotic lesion with some drainage  Musculoskeletal:      Cervical back: Normal range of motion.      Right lower leg: No edema.      Left lower leg: No edema.   Skin:     Coloration: Skin is not jaundiced.   Neurological:      Mental Status: He is alert and oriented to person, place, and time. Mental status is at  baseline.   Psychiatric:         Mood and Affect: Mood normal.         Behavior: Behavior normal.         Laboratory:  Recent Labs     03/23/22  1626   WBC 12.1*   RBC 4.96   HEMOGLOBIN 13.8*   HEMATOCRIT 41.0*   MCV 82.7   MCH 27.8   MCHC 33.7   RDW 37.4   PLATELETCT 190   MPV 11.3         No results for input(s): ALTSGPT, ASTSGOT, ALKPHOSPHAT, TBILIRUBIN, DBILIRUBIN, GAMMAGT, AMYLASE, LIPASE, ALB, PREALBUMIN, GLUCOSE in the last 72 hours.  Recent Labs     03/23/22  1626   APTT 28.3   INR 1.11     No results for input(s): NTPROBNP in the last 72 hours.      No results for input(s): TROPONINT in the last 72 hours.    Imaging:  DX-CHEST-PORTABLE (1 VIEW)    (Results Pending)   CT-PELVIS WITH    (Results Pending)       X-Ray:  I have personally reviewed the images and compared with prior images.  EKG:  I have personally reviewed the images and compared with prior images.    Assessment/Plan:  I anticipate this patient will require at least two midnights for appropriate medical management, necessitating inpatient admission.    * Scrotal swelling- (present on admission)  Assessment & Plan  Patient came in with severe scrotal swelling, erythema and pain  Urology consulted: Taken to surgery tonight  CT pelvis showed bilateral hydroceles no evidence of gas or abscess  Was started on IV antibiotics for possible fornier's   Pain mgmt  NPO    Sepsis (HCC)  Assessment & Plan  This is Sepsis Present on admission  SIRS criteria identified on my evaluation include: Tachycardia, with heart rate greater than 90 BPM and Leukocytosis, with WBC greater than 12,000  Source is Scrotum   Sepsis protocol initiated  Fluid resuscitation ordered per protocol  Crystalloid Fluid Administration: Fluid resuscitation ordered per standard protocol - 30 mL/kg per current or ideal body weight  IV antibiotics as appropriate for source of sepsis  Reassessment: I have reassessed the patient's hemodynamic status      Started on Zosyn, clindamycin,  vancomycin in the setting of possible Ludivina's  CT pelvis not convincing, will see what urology discovers during surgery and will talk to them about continuing antibiotics    Morbid obesity (HCC)  Assessment & Plan  Nutrition consult, consider outpatient nutrition referral    Type 2 diabetes mellitus with hyperglycemia, without long-term current use of insulin ([facility])  Assessment & Plan  Home regimen: Metformin at 1000 mg twice daily, glyburide 10 mg twice daily  Inpatient regimen:SSI q6h since NPO  A1c  Diabetes education      VTE prophylaxis: SCDs/TEDs

## 2022-03-24 NOTE — ANESTHESIA PROCEDURE NOTES
Airway    Date/Time: 3/23/2022 11:32 PM  Performed by: Benjamin Elena M.D.  Authorized by: Benjamin Elena M.D.     Location:  OR  Urgency:  Elective  Indications for Airway Management:  Anesthesia      Spontaneous Ventilation: absent    Sedation Level:  Deep  Preoxygenated: Yes    Patient Position:  Sniffing  Final Airway Type:  Endotracheal airway  Final Endotracheal Airway:  ETT  Cuffed: Yes    Technique Used for Successful ETT Placement:  Video laryngoscopy    Insertion Site:  Oral  Blade Type:  Glide  Laryngoscope Blade/Videolaryngoscope Blade Size:  4  ETT Size (mm):  7.5  Measured from:  Teeth  ETT to Teeth (cm):  23  Placement Verified by: auscultation and capnometry    Cormack-Lehane Classification:  Grade I - full view of glottis  Number of Attempts at Approach:  1

## 2022-03-24 NOTE — PROGRESS NOTES
Patient admitted from ER. Walked to bed, oriented on room and plan. Patient verbalizes understanding. No concerns at this time, resting comfortably in bed, plan for OR.

## 2022-03-24 NOTE — PROGRESS NOTES
4 Eyes Skin Assessment Completed by OREN Good and OREN Foster.    Head WDL  Ears WDL  Nose WDL  Mouth WDL  Neck WDL  Breast/Chest WDL  Shoulder Blades WDL  Spine WDL  (R) Arm/Elbow/Hand WDL  (L) Arm/Elbow/Hand WDL  Abdomen WDL  Groin WDL  Coccyx/Buttocks WDL  Scrotum Redness. Edema, warm to touch   (R) Leg WDL  (L) Leg WDL  (R) Heel/Foot/Toe WDL  (L) Heel/Foot/Toe WDL          Devices In Places SCD's      Interventions In Place Pressure Redistribution Mattress    Possible Skin Injury No    Pictures Uploaded Into Epic N/A  Wound Consult Placed N/A  RN Wound Prevention Protocol Ordered No

## 2022-03-25 LAB
FERRITIN SERPL-MCNC: 397 NG/ML (ref 22–322)
GLUCOSE BLD STRIP.AUTO-MCNC: 193 MG/DL (ref 65–99)
GLUCOSE BLD STRIP.AUTO-MCNC: 210 MG/DL (ref 65–99)
GLUCOSE BLD STRIP.AUTO-MCNC: 230 MG/DL (ref 65–99)
GLUCOSE BLD STRIP.AUTO-MCNC: 234 MG/DL (ref 65–99)
HGB RETIC QN AUTO: 25.3 PG/CELL (ref 29–35)
IMM RETICS NFR: 23 % (ref 9.3–17.4)
IRON SATN MFR SERPL: 9 % (ref 15–55)
IRON SERPL-MCNC: 18 UG/DL (ref 50–180)
RETICS # AUTO: 0.08 M/UL (ref 0.04–0.06)
RETICS/RBC NFR: 2 % (ref 0.8–2.1)
TIBC SERPL-MCNC: 200 UG/DL (ref 250–450)
TRANSFERRIN SERPL-MCNC: 176 MG/DL (ref 200–370)
UIBC SERPL-MCNC: 182 UG/DL (ref 110–370)
VIT B12 SERPL-MCNC: 858 PG/ML (ref 211–911)

## 2022-03-25 PROCEDURE — 82728 ASSAY OF FERRITIN: CPT

## 2022-03-25 PROCEDURE — 83550 IRON BINDING TEST: CPT

## 2022-03-25 PROCEDURE — 84466 ASSAY OF TRANSFERRIN: CPT

## 2022-03-25 PROCEDURE — 94760 N-INVAS EAR/PLS OXIMETRY 1: CPT

## 2022-03-25 PROCEDURE — 700111 HCHG RX REV CODE 636 W/ 250 OVERRIDE (IP): Performed by: HOSPITALIST

## 2022-03-25 PROCEDURE — 82607 VITAMIN B-12: CPT

## 2022-03-25 PROCEDURE — A9270 NON-COVERED ITEM OR SERVICE: HCPCS | Performed by: INTERNAL MEDICINE

## 2022-03-25 PROCEDURE — 36415 COLL VENOUS BLD VENIPUNCTURE: CPT

## 2022-03-25 PROCEDURE — 700102 HCHG RX REV CODE 250 W/ 637 OVERRIDE(OP): Performed by: HOSPITALIST

## 2022-03-25 PROCEDURE — 770006 HCHG ROOM/CARE - MED/SURG/GYN SEMI*

## 2022-03-25 PROCEDURE — 85046 RETICYTE/HGB CONCENTRATE: CPT

## 2022-03-25 PROCEDURE — 700102 HCHG RX REV CODE 250 W/ 637 OVERRIDE(OP): Performed by: INTERNAL MEDICINE

## 2022-03-25 PROCEDURE — 82962 GLUCOSE BLOOD TEST: CPT

## 2022-03-25 PROCEDURE — 700105 HCHG RX REV CODE 258: Performed by: HOSPITALIST

## 2022-03-25 PROCEDURE — 700105 HCHG RX REV CODE 258: Performed by: INTERNAL MEDICINE

## 2022-03-25 PROCEDURE — A9270 NON-COVERED ITEM OR SERVICE: HCPCS | Performed by: HOSPITALIST

## 2022-03-25 PROCEDURE — 99232 SBSQ HOSP IP/OBS MODERATE 35: CPT | Performed by: HOSPITALIST

## 2022-03-25 PROCEDURE — 83540 ASSAY OF IRON: CPT

## 2022-03-25 RX ORDER — SODIUM HYPOCHLORITE 1.25 MG/ML
SOLUTION TOPICAL 2 TIMES DAILY
Status: CANCELLED | OUTPATIENT
Start: 2022-03-25

## 2022-03-25 RX ORDER — SODIUM HYPOCHLORITE 1.25 MG/ML
SOLUTION TOPICAL 2 TIMES DAILY
Status: DISCONTINUED | OUTPATIENT
Start: 2022-03-25 | End: 2022-03-26 | Stop reason: HOSPADM

## 2022-03-25 RX ADMIN — SODIUM CHLORIDE, POTASSIUM CHLORIDE, SODIUM LACTATE AND CALCIUM CHLORIDE: 600; 310; 30; 20 INJECTION, SOLUTION INTRAVENOUS at 10:43

## 2022-03-25 RX ADMIN — PIPERACILLIN AND TAZOBACTAM 4.5 G: 4; .5 INJECTION, POWDER, LYOPHILIZED, FOR SOLUTION INTRAVENOUS; PARENTERAL at 06:10

## 2022-03-25 RX ADMIN — OXYCODONE HYDROCHLORIDE 10 MG: 10 TABLET ORAL at 21:54

## 2022-03-25 RX ADMIN — INSULIN HUMAN 2 UNITS: 100 INJECTION, SOLUTION PARENTERAL at 12:06

## 2022-03-25 RX ADMIN — OXYCODONE HYDROCHLORIDE 10 MG: 10 TABLET ORAL at 09:17

## 2022-03-25 RX ADMIN — LINEZOLID 600 MG: 600 INJECTION, SOLUTION INTRAVENOUS at 18:32

## 2022-03-25 RX ADMIN — PIPERACILLIN AND TAZOBACTAM 4.5 G: 4; .5 INJECTION, POWDER, LYOPHILIZED, FOR SOLUTION INTRAVENOUS; PARENTERAL at 12:13

## 2022-03-25 RX ADMIN — SODIUM CHLORIDE, POTASSIUM CHLORIDE, SODIUM LACTATE AND CALCIUM CHLORIDE: 600; 310; 30; 20 INJECTION, SOLUTION INTRAVENOUS at 21:47

## 2022-03-25 RX ADMIN — PIPERACILLIN AND TAZOBACTAM 4.5 G: 4; .5 INJECTION, POWDER, LYOPHILIZED, FOR SOLUTION INTRAVENOUS; PARENTERAL at 21:57

## 2022-03-25 RX ADMIN — SENNOSIDES AND DOCUSATE SODIUM 2 TABLET: 50; 8.6 TABLET ORAL at 06:11

## 2022-03-25 RX ADMIN — INSULIN HUMAN 1 UNITS: 100 INJECTION, SOLUTION PARENTERAL at 06:18

## 2022-03-25 RX ADMIN — OXYCODONE HYDROCHLORIDE 5 MG: 5 TABLET ORAL at 16:34

## 2022-03-25 RX ADMIN — AMLODIPINE BESYLATE 2.5 MG: 5 TABLET ORAL at 18:09

## 2022-03-25 RX ADMIN — INSULIN HUMAN 2 UNITS: 100 INJECTION, SOLUTION PARENTERAL at 18:06

## 2022-03-25 RX ADMIN — LINEZOLID 600 MG: 600 INJECTION, SOLUTION INTRAVENOUS at 06:10

## 2022-03-25 RX ADMIN — SODIUM CHLORIDE, POTASSIUM CHLORIDE, SODIUM LACTATE AND CALCIUM CHLORIDE: 600; 310; 30; 20 INJECTION, SOLUTION INTRAVENOUS at 01:49

## 2022-03-25 RX ADMIN — SITAGLIPTIN 50 MG: 50 TABLET, FILM COATED ORAL at 18:09

## 2022-03-25 RX ADMIN — SERTRALINE HYDROCHLORIDE 200 MG: 50 TABLET ORAL at 21:54

## 2022-03-25 ASSESSMENT — ENCOUNTER SYMPTOMS
EYES NEGATIVE: 1
NAUSEA: 0
EYE DISCHARGE: 0
PSYCHIATRIC NEGATIVE: 1
PND: 0
RESPIRATORY NEGATIVE: 1
COUGH: 0
SPEECH CHANGE: 0
GASTROINTESTINAL NEGATIVE: 1
NEUROLOGICAL NEGATIVE: 1
FOCAL WEAKNESS: 0
DEPRESSION: 0
MUSCULOSKELETAL NEGATIVE: 1
WEIGHT LOSS: 0
POLYDIPSIA: 0
SPUTUM PRODUCTION: 0
CONSTITUTIONAL NEGATIVE: 1
EYE PAIN: 0
CONSTIPATION: 0
ABDOMINAL PAIN: 0
WHEEZING: 0
SORE THROAT: 0
SEIZURES: 0
FEVER: 0
MYALGIAS: 0
CARDIOVASCULAR NEGATIVE: 1
BACK PAIN: 0

## 2022-03-25 ASSESSMENT — FIBROSIS 4 INDEX: FIB4 SCORE: 0.76

## 2022-03-25 ASSESSMENT — VISUAL ACUITY: OU: 1

## 2022-03-25 ASSESSMENT — PAIN DESCRIPTION - PAIN TYPE
TYPE: SURGICAL PAIN

## 2022-03-25 ASSESSMENT — LIFESTYLE VARIABLES: SUBSTANCE_ABUSE: 0

## 2022-03-25 NOTE — PROGRESS NOTES
Telemetry Shift Summary    Rhythm SR-ST  HR Range 90s-100s  Ectopy none  Measurements 0.14/0.10/0.36        Normal Values  Rhythm SR  HR Range    Measurements 0.12-0.20 / 0.06-0.10  / 0.30-0.52

## 2022-03-25 NOTE — PROGRESS NOTES
Received report from NOC RN. Pt is A&Ox4. VSS. No current complaints of pain at this time. Packing in place in wound, dry and intact. Scant serosanguinous fluid noted on packing. Updated pt on POC for wound consult and pending cultures. All questions were answered at this time. Hourly rounding in place.

## 2022-03-25 NOTE — PROGRESS NOTES
[facility] Medicine Daily Progress Note    Date of Service  3/25/2022    Chief Complaint  Ga Mora is a 32 y.o. 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  The patient thus went to see urology in [place].  Urology at this point recommended the patient immediately come to the emergency room for evaluation.  Patient was thus admitted and then underwent urological incision and drainage.  Patient had minimal drainage that was able to be obtained.  The swelling seems to be localized more to the subcutaneous tissue and the skin.  The patient at this point initially was placed on clindamycin vancomycin and Zosyn.  After discussing with pharmacy we are de-escalating to Zyvox and Zosyn.  Follow culture, pain management.  3/25/2022-scrotal swelling mildly decreased but not significantly.  Cultures still pending.  Pain is still the same.  Diabetes very poorly controlled overall and we had extensive discussion regarding diabetic management.      Interval Problem Update  Day 3 of antibiotics with Zosyn and Zyvox  Postoperative day #2  Still pending cultures  Blood culture so far negative  Pain management  Diabetic optimization and discussion held    I have personally seen and examined the patient at bedside. I discussed the plan of care with patient, bedside RN, charge RN,  and pharmacy.    Consultants/Specialty  urology    Code Status  Full Code    Disposition  Patient is not medically cleared for discharge.   Anticipate discharge to to home with close outpatient follow-up.  I have placed the appropriate orders for post-discharge needs.    Review of Systems  Review of Systems   Constitutional: Negative.  Negative for fever, malaise/fatigue and weight loss.   HENT: Negative.  Negative for ear pain, nosebleeds and sore throat.    Eyes: Negative.  Negative  for pain and discharge.   Respiratory: Negative.  Negative for cough, sputum production and wheezing.    Cardiovascular: Negative.  Negative for chest pain, leg swelling and PND.   Gastrointestinal: Negative.  Negative for abdominal pain, constipation and nausea.   Genitourinary: Negative for dysuria.        Scrotal pain   Musculoskeletal: Negative.  Negative for back pain and myalgias.   Skin: Negative.  Negative for itching.   Neurological: Negative.  Negative for speech change, focal weakness and seizures.   Endo/Heme/Allergies: Negative.  Negative for polydipsia.   Psychiatric/Behavioral: Negative.  Negative for depression and substance abuse.   All other systems reviewed and are negative.       Physical Exam  Temp:  [36.7 °C (98 °F)-37.2 °C (98.9 °F)] 36.7 °C (98 °F)  Pulse:  [100-112] 105  Resp:  [16-18] 16  BP: (146-155)/(71-85) 148/71  SpO2:  [90 %-94 %] 91 %    Physical Exam  Vitals and nursing note reviewed. Exam conducted with a chaperone present.   Constitutional:       General: He is awake.      Appearance: He is well-developed and well-groomed. He is obese. He is ill-appearing.   HENT:      Head: Normocephalic and atraumatic.      Jaw: There is normal jaw occlusion. No trismus.      Salivary Glands: Right salivary gland is not tender. Left salivary gland is not tender.      Nose: Nose normal.      Mouth/Throat:      Pharynx: Oropharynx is clear.   Eyes:      General: Lids are normal. Vision grossly intact.      Extraocular Movements: Extraocular movements intact.      Conjunctiva/sclera: Conjunctivae normal.      Right eye: Right conjunctiva is not injected. No exudate.     Left eye: Left conjunctiva is not injected. No exudate.     Pupils: Pupils are equal, round, and reactive to light.   Neck:      Thyroid: No thyroid mass.      Vascular: No hepatojugular reflux or JVD.      Trachea: No abnormal tracheal secretions or tracheal deviation.   Cardiovascular:      Rate and Rhythm: Normal rate and regular  rhythm. Occasional extrasystoles are present.     Pulses: Normal pulses.      Heart sounds: Normal heart sounds.   Pulmonary:      Effort: Pulmonary effort is normal.      Breath sounds: Examination of the right-lower field reveals decreased breath sounds. Examination of the left-lower field reveals decreased breath sounds. Decreased breath sounds present.   Chest:   Breasts:      Right: No supraclavicular adenopathy.      Left: No supraclavicular adenopathy.       Abdominal:      General: Abdomen is flat.      Palpations: Abdomen is soft.      Tenderness: There is no abdominal tenderness. There is no right CVA tenderness or left CVA tenderness.      Hernia: No hernia is present.   Genitourinary:     Comments: Scrotal swelling, tenderness, redness  Musculoskeletal:      Cervical back: Full passive range of motion without pain.      Right lower leg: No edema.      Left lower leg: No edema.   Lymphadenopathy:      Head:      Right side of head: No submental adenopathy.      Left side of head: No submental adenopathy.      Cervical:      Right cervical: No superficial cervical adenopathy.     Left cervical: No superficial cervical adenopathy.      Upper Body:      Right upper body: No supraclavicular adenopathy.      Left upper body: No supraclavicular adenopathy.   Skin:     General: Skin is warm and moist.      Capillary Refill: Capillary refill takes less than 2 seconds.      Coloration: Skin is not cyanotic.      Findings: No abrasion.   Neurological:      General: No focal deficit present.      Mental Status: He is alert.   Psychiatric:         Attention and Perception: Attention and perception normal.         Mood and Affect: Mood normal.         Speech: Speech normal.         Behavior: Behavior is cooperative.         Thought Content: Thought content normal.         Judgment: Judgment normal.         Fluids    Intake/Output Summary (Last 24 hours) at 3/25/2022 1537  Last data filed at 3/25/2022 1400  Gross per  24 hour   Intake 480 ml   Output --   Net 480 ml       Laboratory  Recent Labs     03/23/22  1626 03/24/22  0214   WBC 12.1* 10.4   RBC 4.96 4.39*   HEMOGLOBIN 13.8* 12.0*   HEMATOCRIT 41.0* 36.6*   MCV 82.7 83.4   MCH 27.8 27.3   MCHC 33.7 32.8*   RDW 37.4 38.7   PLATELETCT 190 156*   MPV 11.3 11.7     Recent Labs     03/23/22  1626 03/24/22  0214   SODIUM 134* 132*   POTASSIUM 3.7 3.9   CHLORIDE 99 99   CO2 19* 19*   GLUCOSE 285* 257*   BUN 7* 6*   CREATININE 0.61 0.51   CALCIUM 9.4 8.6     Recent Labs     03/23/22  1626   APTT 28.3   INR 1.11               Imaging  CT-PELVIS WITH   Final Result      1.  There are bilateral hydroceles with associated fluid in the surrounding perineum. There is no enhancing fluid collection or gas within this fluid to suggest focal abscess.   2.  There are small reactive lymph nodes in the inguinal regions.   3.  There is no evidence of a perirectal or perianal abscess.      DX-CHEST-PORTABLE (1 VIEW)   Final Result      No acute cardiopulmonary process is seen.           Assessment/Plan  * Scrotal swelling- (present on admission)  Assessment & Plan  Scrotal swelling has improved  No drainage at this point  Cultures pending  Continue with antibiotics    Sepsis ([facility])  Assessment & Plan  Sepsis has resolved    Hyponatremia  Assessment & Plan  Continue fluid resuscitation    Type 2 diabetes mellitus with hyperglycemia, without long-term current use of insulin ([facility])  Assessment & Plan  Extensive discussion held with patient regarding diabetic management  Initiate Januvia and continue with Metformin and glyburide and continue with sliding scale coverage  Most recent hemoglobin A1c is 10.5    Thrombocytopenia ([facility])  Assessment & Plan  Monitor platelet count  Currently no bleeding      Normochromic normocytic anemia  Assessment & Plan  B12 and iron panel pending    Morbid obesity ([facility])  Assessment & Plan  Body mass index is 54.93 kg/m².  Outpatient weight loss management program and  lifestyle modification highly recommended       VTE prophylaxis: SCDs/TEDs    I have performed a physical exam and reviewed and updated ROS and Plan today (3/25/2022). In review of yesterday's note (3/24/2022), there are no changes except as documented above.

## 2022-03-25 NOTE — PROGRESS NOTES
Note to reader: this note follows the APSO format rather than the historical SOAP format. Assessment and plan located at the top of the note for ease of use.    Chief Complaint  32 y.o. year old male here with scrotal abscess s/p OR I&D with Dr. Osorio 03/23/22.    Assessment/Plan  Interval History   Scrotal abscess  Scrotal edema  Hydroceles  Obesity  Diabetes uncontrolled      Wound care consult for packing changes-OK to change today with saline wet to dry  Please find means to secure gauze and elevate scrotum  Antibiotics per Hospital team  Urology will continue to follow      Case discussed with patient, Hospitalist and Urology-Dr. Devon LEON  Patient seen and examined    3/25. POD#2. Doing well, pain 5/10 and improved with pain medicine. +flatus, tolerating regular PO diet. Still on Zosyn and Zyvox, pending cultures. WBCs improved to 10.4. H/H 12.0/36.6. No improvement in scrotal swelling, used mesh overnight but d/c due to discomfort.    3/24. POD #1. Doing well. Minimal pain. On Zosyn and Zyvox pending cultures. WBC normalized. T Max 101.           Review of Systems  Physical Exam   Review of Systems   Constitutional: Negative for fever.   Genitourinary:        Mild scrotal discomfort     Vitals:    03/24/22 2300 03/25/22 0500 03/25/22 0700 03/25/22 0801   BP: 155/83 148/85  146/76   Pulse: (!) 110 100  (!) 106   Resp: 18 18  17   Temp: 36.9 °C (98.5 °F) 36.8 °C (98.3 °F)  37.2 °C (98.9 °F)   TempSrc: Oral Oral  Oral   SpO2: 94% 91%  90%   Weight:   (!) 185 kg (406 lb 15.5 oz)    Height:         Physical Exam  Vitals and nursing note reviewed.   Constitutional:       Appearance: Normal appearance. He is obese.   HENT:      Head: Normocephalic and atraumatic.   Cardiovascular:      Rate and Rhythm: Tachycardia present.   Pulmonary:      Effort: Pulmonary effort is normal.   Abdominal:      General: Abdomen is flat. There is no distension.      Palpations: Abdomen is soft.      Tenderness: There is no abdominal  tenderness.   Genitourinary:     Comments: Massive scrotal edema with overlying redness. I & D site left inferior scrotum with gauze in place. No purulent drainage, no tenderness.  Musculoskeletal:      Cervical back: Normal range of motion.   Neurological:      Mental Status: He is alert.          Hematology Chemistry   Lab Results   Component Value Date/Time    WBC 10.4 03/24/2022 02:14 AM    HEMOGLOBIN 12.0 (L) 03/24/2022 02:14 AM    HEMATOCRIT 36.6 (L) 03/24/2022 02:14 AM    PLATELETCT 156 (L) 03/24/2022 02:14 AM     Lab Results   Component Value Date/Time    SODIUM 132 (L) 03/24/2022 02:14 AM    POTASSIUM 3.9 03/24/2022 02:14 AM    CHLORIDE 99 03/24/2022 02:14 AM    CO2 19 (L) 03/24/2022 02:14 AM    GLUCOSE 257 (H) 03/24/2022 02:14 AM    BUN 6 (L) 03/24/2022 02:14 AM    CREATININE 0.51 03/24/2022 02:14 AM         Labs not explicitly included in this progress note were reviewed by the author.   Radiology/imaging not explicitly included in this progress note was reviewed by the author.     Labs reviewed and Medications reviewed                   Alivia Jones PA-C  [facility] [place]

## 2022-03-25 NOTE — PROGRESS NOTES
Report received from Day RN, assumed care of pt.   POC and medications reviewed with pt. Pt verbalized understanding.   AOx4  PT had mesh underwear in place as securement for scrotum. Mesh was causing pt pain and was removed per pt request. Gauze applied over packing site with tegaderm to secure. PT reported feeling more comfortable after.  Denies SOB, or dizziness at this time.   Safety measures in place.

## 2022-03-25 NOTE — CARE PLAN
The patient is Stable - Low risk of patient condition declining or worsening    Shift Goals  Clinical Goals: pt will maintain pain control after pain administration  Patient Goals: comfort  Family Goals: NA    Problem: Pain - Standard  Goal: Alleviation of pain or a reduction in pain to the patient’s comfort goal  Outcome: Progressing     Problem: Knowledge Deficit - Standard  Goal: Patient and family/care givers will demonstrate understanding of plan of care, disease process/condition, diagnostic tests and medications  Outcome: Progressing     Problem: Fluid Volume  Goal: Fluid volume balance will be maintained  Outcome: Progressing     Problem: Urinary - Renal Perfusion  Goal: Ability to achieve and maintain adequate renal perfusion and functioning will improve  Outcome: Progressing     Progress made toward(s) clinical / shift goals:  Pt tolerating pain with repositioning and pain medication administration. Pt ambulating to bathroom to void. Tolerating well. Progressing on other goals    Patient is not progressing towards the following goals:

## 2022-03-25 NOTE — PROGRESS NOTES
Monitor Summary:    Rhythm:  ST   Rate Range:  109-135, touched up to 157  Ectopy: No ectopy    Measurements:  0.14/0.10/0.32

## 2022-03-25 NOTE — CARE PLAN
The patient is Watcher - Medium risk of patient condition declining or worsening    Shift Goals  Clinical Goals: pt will state pain is 3/10 or less after med admin for duration of shift  Patient Goals: pain control  Family Goals: NA    Progress made toward(s) clinical / shift goals:    Problem: Pain - Standard  Goal: Alleviation of pain or a reduction in pain to the patient’s comfort goal  Outcome: Progressing     Problem: Knowledge Deficit - Standard  Goal: Patient and family/care givers will demonstrate understanding of plan of care, disease process/condition, diagnostic tests and medications  Outcome: Progressing     Problem: Fluid Volume  Goal: Fluid volume balance will be maintained  Outcome: Progressing       Patient is not progressing towards the following goals:

## 2022-03-26 VITALS
HEIGHT: 72 IN | BODY MASS INDEX: 42.66 KG/M2 | TEMPERATURE: 98 F | HEART RATE: 98 BPM | RESPIRATION RATE: 16 BRPM | WEIGHT: 315 LBS | DIASTOLIC BLOOD PRESSURE: 72 MMHG | OXYGEN SATURATION: 90 % | SYSTOLIC BLOOD PRESSURE: 144 MMHG

## 2022-03-26 LAB
BACTERIA UR CULT: NORMAL
BACTERIA WND AEROBE CULT: ABNORMAL
BACTERIA WND AEROBE CULT: ABNORMAL
GLUCOSE BLD STRIP.AUTO-MCNC: 202 MG/DL (ref 65–99)
GLUCOSE BLD STRIP.AUTO-MCNC: 217 MG/DL (ref 65–99)
GRAM STN SPEC: ABNORMAL
GRAM STN SPEC: NORMAL
GRAM STN SPEC: NORMAL
SIGNIFICANT IND 70042: ABNORMAL
SIGNIFICANT IND 70042: NORMAL
SITE SITE: ABNORMAL
SITE SITE: NORMAL
SOURCE SOURCE: ABNORMAL
SOURCE SOURCE: NORMAL

## 2022-03-26 PROCEDURE — 99239 HOSP IP/OBS DSCHRG MGMT >30: CPT | Performed by: HOSPITALIST

## 2022-03-26 PROCEDURE — A9270 NON-COVERED ITEM OR SERVICE: HCPCS | Performed by: INTERNAL MEDICINE

## 2022-03-26 PROCEDURE — 700105 HCHG RX REV CODE 258: Performed by: INTERNAL MEDICINE

## 2022-03-26 PROCEDURE — 700102 HCHG RX REV CODE 250 W/ 637 OVERRIDE(OP): Performed by: HOSPITALIST

## 2022-03-26 PROCEDURE — 82962 GLUCOSE BLOOD TEST: CPT

## 2022-03-26 PROCEDURE — 700105 HCHG RX REV CODE 258: Performed by: HOSPITALIST

## 2022-03-26 PROCEDURE — A9270 NON-COVERED ITEM OR SERVICE: HCPCS | Performed by: HOSPITALIST

## 2022-03-26 PROCEDURE — 700111 HCHG RX REV CODE 636 W/ 250 OVERRIDE (IP): Performed by: HOSPITALIST

## 2022-03-26 PROCEDURE — 700102 HCHG RX REV CODE 250 W/ 637 OVERRIDE(OP): Performed by: INTERNAL MEDICINE

## 2022-03-26 PROCEDURE — 700101 HCHG RX REV CODE 250: Performed by: HOSPITALIST

## 2022-03-26 RX ORDER — SODIUM HYPOCHLORITE 1.25 MG/ML
5 SOLUTION TOPICAL 2 TIMES DAILY
Qty: 300 ML | Refills: 0 | Status: SHIPPED | OUTPATIENT
Start: 2022-03-26 | End: 2022-04-25

## 2022-03-26 RX ORDER — OXYCODONE HYDROCHLORIDE 5 MG/1
5 TABLET ORAL EVERY 6 HOURS PRN
Qty: 30 TABLET | Refills: 0 | Status: SHIPPED | OUTPATIENT
Start: 2022-03-26 | End: 2022-04-02

## 2022-03-26 RX ORDER — AMOXICILLIN AND CLAVULANATE POTASSIUM 875; 125 MG/1; MG/1
1 TABLET, FILM COATED ORAL 2 TIMES DAILY
Qty: 20 TABLET | Refills: 0 | Status: SHIPPED | OUTPATIENT
Start: 2022-03-26 | End: 2022-04-05

## 2022-03-26 RX ADMIN — SODIUM CHLORIDE, POTASSIUM CHLORIDE, SODIUM LACTATE AND CALCIUM CHLORIDE: 600; 310; 30; 20 INJECTION, SOLUTION INTRAVENOUS at 05:38

## 2022-03-26 RX ADMIN — LINEZOLID 600 MG: 600 INJECTION, SOLUTION INTRAVENOUS at 05:30

## 2022-03-26 RX ADMIN — SODIUM HYPOCHLORITE 1 ML: 1.25 SOLUTION TOPICAL at 05:01

## 2022-03-26 RX ADMIN — OXYCODONE HYDROCHLORIDE 5 MG: 5 TABLET ORAL at 09:33

## 2022-03-26 RX ADMIN — PIPERACILLIN AND TAZOBACTAM 4.5 G: 4; .5 INJECTION, POWDER, LYOPHILIZED, FOR SOLUTION INTRAVENOUS; PARENTERAL at 04:40

## 2022-03-26 RX ADMIN — INSULIN HUMAN 2 UNITS: 100 INJECTION, SOLUTION PARENTERAL at 00:33

## 2022-03-26 RX ADMIN — SITAGLIPTIN 50 MG: 50 TABLET, FILM COATED ORAL at 04:39

## 2022-03-26 RX ADMIN — INSULIN HUMAN 1 UNITS: 100 INJECTION, SOLUTION PARENTERAL at 05:07

## 2022-03-26 RX ADMIN — INSULIN HUMAN 2 UNITS: 100 INJECTION, SOLUTION PARENTERAL at 12:52

## 2022-03-26 ASSESSMENT — PAIN DESCRIPTION - PAIN TYPE
TYPE: SURGICAL PAIN
TYPE: SURGICAL PAIN

## 2022-03-26 ASSESSMENT — ENCOUNTER SYMPTOMS: FEVER: 0

## 2022-03-26 NOTE — PROGRESS NOTES
Note to reader: this note follows the APSO format rather than the historical SOAP format. Assessment and plan located at the top of the note for ease of use.    Chief Complaint  32 y.o. year old male here with scrotal abscess    Assessment/Plan  Interval History   Scrotal abscess  Scrotal edema  Hydroceles  Obesity  Diabetes      Daily packing changes  Antibiotics per Hospital team  No further surgical management anticipated      Case discussed with patient, family, Hospitalist and Urology-Dr. Cande LEON  Patient seen and examined    3/26. POD #3. No overnight events. Wound care involved for bandage changes. Group B Strep growing in culture. Remains on Zosyn and Zyvox.     3/24. POD #1. Doing well. Minimal pain. On Zosyn and Zyvox pending cultures. WBC normalized. T Max 101.           Review of Systems  Physical Exam   Review of Systems   Constitutional: Negative for fever.   Genitourinary:        Mild scrotal discomfort     Vitals:    03/25/22 2100 03/26/22 0100 03/26/22 0500 03/26/22 0804   BP: 147/70 153/70 148/69 153/69   Pulse: 98 95 100 (!) 106   Resp: 16 16 17 16   Temp: 36.9 °C (98.4 °F) 36.9 °C (98.5 °F) 37.1 °C (98.7 °F) 36.9 °C (98.4 °F)   TempSrc: Temporal Temporal Temporal Oral   SpO2: 95% 92% 90% 91%   Weight:       Height:         Physical Exam  Vitals and nursing note reviewed.   Pulmonary:      Effort: Pulmonary effort is normal.   Genitourinary:     Comments: Massive scrotal edema with overlying redness. I & D site left inferior scrotum with gauze in place. No purulent drainage, no tenderness         Hematology Chemistry   Lab Results   Component Value Date/Time    WBC 10.4 03/24/2022 02:14 AM    HEMOGLOBIN 12.0 (L) 03/24/2022 02:14 AM    HEMATOCRIT 36.6 (L) 03/24/2022 02:14 AM    PLATELETCT 156 (L) 03/24/2022 02:14 AM     Lab Results   Component Value Date/Time    SODIUM 132 (L) 03/24/2022 02:14 AM    POTASSIUM 3.9 03/24/2022 02:14 AM    CHLORIDE 99 03/24/2022 02:14 AM    CO2 19 (L) 03/24/2022  02:14 AM    GLUCOSE 257 (H) 03/24/2022 02:14 AM    BUN 6 (L) 03/24/2022 02:14 AM    CREATININE 0.51 03/24/2022 02:14 AM         Labs not explicitly included in this progress note were reviewed by the author.   Radiology/imaging not explicitly included in this progress note was reviewed by the author.     Core Measures

## 2022-03-26 NOTE — WOUND TEAM
[facility]  Inpatient Services  Initial Wound and Skin Care Evaluation    Admission Date: 3/23/2022     Last order of IP CONSULT TO WOUND CARE was found on 3/24/2022 from Hospital Encounter on 3/23/2022     HPI, PMH, SH: Reviewed    Past Surgical History:   Procedure Laterality Date   • DE EXPLORE SCROTUM Left 3/23/2022    Procedure: EXPLORATION, SCROTUM I&D SCROTAL ABSCESS;  Surgeon: Jonathan Osorio M.D.;  Location: SURGERY [facility];  Service: Urology   • GASTRIC SLEEVE LAPAROSCOPY  11/21/2018    Procedure: GASTRIC SLEEVE LAPAROSCOPY;  Surgeon: Mahin Hull M.D.;  Location: SURGERY [facility];  Service: General   • LIVER BIOPSY LAPAROSCOPIC  11/21/2018    Procedure: LIVER BIOPSY LAPAROSCOPIC;  Surgeon: Mahin Hull M.D.;  Location: SURGERY [facility];  Service: General   • HYDROCELECTOMY ADULT  2010   • TONSILLECTOMY  2004   • EYE SURGERY  1995    eye exam under anesthesia after dog bite   • INGUINAL HERNIA REPAIR Bilateral 2008/2009     Social History     Tobacco Use   • Smoking status: Never Smoker   • Smokeless tobacco: Never Used   Substance Use Topics   • Alcohol use: No     Chief Complaint   Patient presents with   • Groin Pain     Started about two weeks ago, has been taking antibx without relief     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 On 3/24, Dr. Osorio took patient to OR for I&D of his scrotal abscess. Pt with hx of Type 2 DM.     WOUND ASSESSMENT/LDA       Wound 03/24/22 Incision Scrotum Left packed with 4x4, betadine, scrotum strap (Active)   Wound Image    03/25/22 1600   Site Assessment Yellow;Brown;Red 03/25/22 1600   Periwound Assessment Red;Edema 03/25/22 1600   Margins Defined edges;Unattached edges 03/25/22 1600   Closure Secondary intention 03/25/22 1600   Drainage Amount Small  03/25/22 1600   Drainage Description Yellow 03/25/22 1600   Treatments Cleansed;Site care 03/25/22 1600   Wound Cleansing Normal Saline Irrigation 03/25/22 1600   Periwound Protectant Skin Protectant Wipes to Periwound 03/25/22 1600   Dressing Cleansing/Solutions Normal Saline 03/25/22 1600   Dressing Options Moist Roll Gauze;Silicone Adhesive Foam 03/25/22 1600   Dressing Changed New 03/25/22 1600   Dressing Status Clean;Dry;Intact 03/25/22 1600   Dressing Change/Treatment Frequency Every Shift, and As Needed 03/25/22 1600   NEXT Dressing Change/Treatment Date 03/25/22 03/25/22 1600   NEXT Weekly Photo (Inpatient Only) 04/01/22 03/25/22 1600   Non-staged Wound Description Full thickness 03/25/22 1600   Wound Length (cm) 5 cm 03/25/22 1600   Wound Width (cm) 2 cm 03/25/22 1600   Wound Depth (cm) 1.5 cm 03/25/22 1600   Wound Surface Area (cm^2) 10 cm^2 03/25/22 1600   Wound Volume (cm^3) 15 cm^3 03/25/22 1600   Shape oval 03/25/22 1600   Wound Odor None 03/25/22 1600   Exposed Structures None 03/25/22 1600   WOUND NURSE ONLY - Time Spent with Patient (mins) 30 03/25/22 1600          Vascular:    MACRINA:   No results found.    Lab Values:    Lab Results   Component Value Date/Time    WBC 10.4 03/24/2022 02:14 AM    RBC 4.39 (L) 03/24/2022 02:14 AM    HEMOGLOBIN 12.0 (L) 03/24/2022 02:14 AM    HEMATOCRIT 36.6 (L) 03/24/2022 02:14 AM    CREACTPROT 22.67 (H) 03/23/2022 04:26 PM    SEDRATEWES 46 (H) 03/23/2022 04:26 PM    HBA1C 10.5 (H) 03/23/2022 04:26 PM        Culture Results show:  No results found for this or any previous visit (from the past 720 hour(s)).    Pain Level/Medicated:  Mild pain with site care     INTERVENTIONS BY WOUND TEAM:  Chart and images reviewed. Discussed with bedside RN. All areas of concern (based on picture review, LDA review and discussion with bedside RN) have been thoroughly assessed. Documentation of areas based on significant findings. This RN in to assess patient. Performed standard wound  care which includes appropriate positioning, dressing removal and non-selective debridement. Pictures and measurements obtained weekly if/when required.    Preparation for Dressing removal: Soaked with NS   Non-selectively Debrided with:  NS and gauze.  Sharp debridement: NA  Shefali wound: Cleansed with NS and gauze, Prepped with no sting skin prep  Primary Dressing: NS moistened roll gauze  Secondary (Outer) Dressing: silicone adhesive foam, interdry     Interdisciplinary consultation: Patient, Bedside RN, Wound RN Luisa SQUIRES     EVALUATION / RATIONALE FOR TREATMENT:  Most Recent Date:  3/25/22: Wound along left aspect of scrotum with yellow and dark non-viable tissue. No undermining or tracts on assessment. Periwound erythematous and edematous. Dakins ordered for application to chemically debride nonviable tissue. Patient on IV abx. Interdry utilized to secure gauze and elevate scrotum. Z-pillow recommended as well for positioning.      Goals: Steady decrease in wound area and depth weekly.    WOUND TEAM PLAN OF CARE ([X] for frequency of wound follow up,):   Nursing to follow orders written for wound care. Contact wound team if area fails to progress, deteriorates or with any questions/concerns  Dressing changes by wound team:                   Follow up 3 times weekly:                NPWT change 3 times weekly:     Follow up 1-2 times weekly:    X  Follow up Bi-Monthly:                   Follow up as needed:     Other (explain):     NURSING PLAN OF CARE ORDERS (X):  Dressing changes: See Dressing Care orders: X  Skin care: See Skin Care orders:   RN Prevention Protocol:   Rectal tube care: See Rectal Tube Care orders:   Other orders:    RSKIN:   CURRENTLY IN PLACE (X), APPLIED THIS VISIT (A), ORDERED (O):   Q shift Macario:  X  Q shift pressure point assessments:  X    Surface/Positioning   Pressure redistribution mattress            Low Airloss          Bariatric foam      Bariatric VIKRAM     Waffle cushion         Waffle Overlay          Reposition q 2 hours      TAPs Turning system     Z Antwon Pillow O                Containment/Moisture Prevention   Rectal tube or BMS    Purwick/Condom Cath        Fenton Catheter    Barrier wipes           Barrier paste       Antifungal tx      Interdry    A      Nutrition   Dietician        Diabetes Education      PO  X   TF     TPN     NPO   # days     Other        Anticipated discharge plans:   LTACH:        SNF/Rehab:                  Home Health Care:  X recommend          Outpatient Wound Center:      X recommend   Self/Family Care:      X  Other:                  Vac Discharge Needs:  Not Applicable Pt not on a wound vac:    X   Regular Vac while inpatient, alternative dressing at DC:        Regular Vac in use and continued at DC:            Reg. Vac w/ Skin Sub/Biologic in use. Will need to be changed 2x wkly:      Veraflo Vac while inpatient, ok to transition to Regular Vac on Discharge:           Veraflo Vac while inpatient, will need to remain on Veraflo Vac upon discharge:

## 2022-03-26 NOTE — PROGRESS NOTES
Received bedside patient report from day shift RN. Patient resting in bed, no complaints at this time. Safety precautions in place.

## 2022-03-26 NOTE — PROGRESS NOTES
Telemetry Shift Summary     Rhythm: SR/ST  HR:   Ectopy: r PVC    Measurements: 0.18/0.10/0.36    Normal Values  Rhythm: SR  HR:   Measurements: 0.12-0.20/0.08-0.10/0.30-0.52

## 2022-03-26 NOTE — DISCHARGE SUMMARY
Discharge Summary    CHIEF COMPLAINT ON ADMISSION  Chief Complaint   Patient presents with   • Groin Pain     Started about two weeks ago, has been taking antibx without relief       Reason for Admission  Sent by MD      Admission Date  3/23/2022    CODE STATUS  Full Code    HPI & HOSPITAL COURSE  Justen is a 32-year-old pleasant gentleman who comes into the hospital with severe scrotal swelling.  The scrotal swelling has been ongoing for several days, as he is a teacher to continue teaching with it.  Upon evaluation by urology they immediately sent him to the emergency room.  Here in the emergency room the patient was admitted and was sent for surgery for debridement cultures were taken.  His cultures have grown out Streptococcus group B.  The patient's scrotal swelling slowly is improving however most of the inflammation and infection were in the soft tissue.  Because of this the progress will be very slow.  The underlying problem is that the patient has uncontrolled diabetes and is most likely why he developed infection in the first place.  The diabetic education at this point was addressed.  His most recent hemoglobin A1c is 10.5 at this point I have given him education and also increase his management with adding Januvia.  The patient will be given these medications on discharge including Metformin and glyburide.  He is also been asked to document his Accu-Cheks and do them 3 times a day with meals and then report back to his primary care physician within 7 to 10 days.  To see if his blood sugars are stabilizing.  He will need outpatient follow-up with urology and this has also been communicated with him and urology referrals have been placed.  Patient will be given pain management as well and he has been given instructions on wound care.  Patient and family at bedside were also educated on the entire treatment plan.  Patient is otherwise stable and can discharge home with outpatient follow-ups and  monitoring.    Therefore, he is discharged in good and stable condition to home with close outpatient follow-up.    The patient met 2-midnight criteria for an inpatient stay at the time of discharge.    Discharge Date  3/26/2022    FOLLOW UP ITEMS POST DISCHARGE  Follow-up with the primary care physician in 7 to 10 days  Follow with urology  Endocrinology referral  Follow-up with outpatient weight management program    DISCHARGE DIAGNOSES  Principal Problem:    Scrotal swelling POA: Yes  Active Problems:    Sepsis (HCC) POA: Unknown    Type 2 diabetes mellitus with hyperglycemia, without long-term current use of insulin (HCC) POA: Unknown    Hyponatremia POA: Unknown    Morbid obesity (HCC) POA: Unknown    Normochromic normocytic anemia POA: Unknown    Thrombocytopenia (HCC) POA: Unknown  Resolved Problems:    * No resolved hospital problems. *      FOLLOW UP  No future appointments.  No follow-up provider specified.    MEDICATIONS ON DISCHARGE     Medication List      START taking these medications      Instructions   amoxicillin-clavulanate 875-125 MG Tabs  Commonly known as: AUGMENTIN   Take 1 Tablet by mouth 2 times a day for 10 days.  Dose: 1 Tablet     dakins 0.125% (1/4 strength) 0.125 % Soln   Apply 5 mL topically 2 times a day for 30 days.  Dose: 5 mL     oxyCODONE immediate-release 5 MG Tabs  Commonly known as: ROXICODONE   Take 1 Tablet by mouth every 6 hours as needed for up to 7 days.  Dose: 5 mg     SITagliptin 50 MG Tabs  Start taking on: March 27, 2022  Commonly known as: JANUVIA   Take 1 Tablet by mouth every day.  Dose: 50 mg        CONTINUE taking these medications      Instructions   acetaminophen 500 MG Tabs  Commonly known as: TYLENOL   Take 1,000 mg by mouth every 6 hours as needed for Moderate Pain.  Dose: 1,000 mg     amLODIPine 2.5 MG Tabs  Commonly known as: NORVASC   Take 2.5 mg by mouth every evening. Indications: High Blood Pressure Disorder  Dose: 2.5 mg     glyBURIDE 5 MG  Tabs  Commonly known as: DIABETA   Take 10 mg by mouth 2 Times a Day.  Dose: 10 mg     ibuprofen 200 MG Tabs  Commonly known as: MOTRIN   Take 600 mg by mouth every 6 hours as needed for Mild Pain.  Dose: 600 mg     metFORMIN 500 MG Tabs  Commonly known as: GLUCOPHAGE   Take 1,000 mg by mouth 2 times a day.  Dose: 1,000 mg     sertraline 100 MG Tabs  Commonly known as: Zoloft   Take 200 mg by mouth every bedtime. anxiety  Dose: 200 mg        STOP taking these medications    levoFLOXacin 750 MG tablet  Commonly known as: LEVAQUIN            Allergies  Allergies   Allergen Reactions   • Ceclor [Cefaclor] Rash     Childhood allergies when pt was 6 years old received a rash        DIET  Orders Placed This Encounter   Procedures   • Diet Order Diet: Consistent CHO (Diabetic)     Standing Status:   Standing     Number of Occurrences:   1     Order Specific Question:   Diet:     Answer:   Consistent CHO (Diabetic) [4]       ACTIVITY  As tolerated.  Weight bearing as tolerated    CONSULTATIONS  Urology    PROCEDURES  Incision and drainage of left scrotal sac    LABORATORY  Lab Results   Component Value Date    SODIUM 132 (L) 03/24/2022    POTASSIUM 3.9 03/24/2022    CHLORIDE 99 03/24/2022    CO2 19 (L) 03/24/2022    GLUCOSE 257 (H) 03/24/2022    BUN 6 (L) 03/24/2022    CREATININE 0.51 03/24/2022        Lab Results   Component Value Date    WBC 10.4 03/24/2022    HEMOGLOBIN 12.0 (L) 03/24/2022    HEMATOCRIT 36.6 (L) 03/24/2022    PLATELETCT 156 (L) 03/24/2022        Total time of the discharge process exceeds 45 minutes.

## 2022-03-26 NOTE — PROGRESS NOTES
Telemetry Shift Summary    Rhythm SR/ST  HR Range 100-109  Ectopy none  Measurements 0.18/0.10/0.36        Normal Values  Rhythm SR  HR Range    Measurements 0.12-0.20 / 0.06-0.10  / 0.30-0.52

## 2022-03-26 NOTE — DISCHARGE INSTRUCTIONS
SCROTUM: Remove dressing and packing. Cleanse wound with wound cleanser or normal saline and gauze. Pat dry. Apply no sting skin prep to skin around wound. Using a cotton tip applicator pack wound using one CONTINUOUS piece of SILVER packing (Ensure a tail is left out of wound for ease of removal as well as to wick away drainage). Secure in place with a silicone adhesive foam. Can also use interdry to lift scrotum for patient comfort. Please change every day or as needed if saturated or dislodged.    Scrotal Swelling  Scrotal swelling refers to a condition in which the sac of skin that contains the testes (scrotum) is enlarged or swollen. Many things can cause the scrotum to enlarge or swell, including:  · Fluid around the testicle (hydrocele).  · A weakened area in the muscles around the groin (hernia).  · An enlarged vein around the testicle (varicocele).  · An injury.  · An infection.  · Certain medical treatments.  · Certain medical conditions, such as congestive heart failure.  · A recent genital surgery or procedure.  · A twisting of the spermatic cord that cuts off blood supply (testicular torsion).  · Testicular cancer.  Scrotal swelling can happen along with scrotal pain.  Follow these instructions at home:  · Until the swelling goes away:  ? Rest. The best position to rest in is to lie down.  ? Limit activity.  · Put ice on the scrotum:  ? Put ice in a plastic bag.  ? Place a towel between your skin and the bag.  ? Leave the ice on for 20 minutes, 2-3 times a day for 1-2 days.  · Place a rolled towel under your testicles for support.  · Wear loose-fitting clothing or an athletic support cup for comfort.  · Take over-the-counter and prescription medicines only as told by your health care provider.  · Perform a monthly self-exam of the scrotum and penis. Feel for changes. Ask your health care provider how to perform a monthly self-exam if you are unsure.  Contact a health care provider if:  · You have a  sudden pain that is persistent and does not improve.  · You have a heavy feeling or notice fluid in the scrotum.  · You have pain or burning while urinating.  · You have blood in your urine or semen.  · You feel a lump around the testicle.  · You notice that one testicle is larger than the other. Keep in mind that a small difference in size is normal.  · You have a persistent dull ache or pain in your groin or scrotum.  Get help right away if:  · The pain does not go away.  · The pain becomes severe.  · You have a fever or chills.  · You have pain or vomiting that cannot be controlled.  · One or both sides of the scrotum are very red and swollen.  · There is redness spreading upward from your scrotum to your abdomen or downward from your scrotum to your thighs.  Summary  · Scrotal swelling refers to a condition in which the sac of skin that contains the testes (scrotum) is enlarged.  · Many things can cause the scrotum to swell, including hydrocele, a hernia, and a varicocele.  · Limiting activity and icing the scrotum may help reduce swelling and pain.  · Contact your health care provider if you develop scrotal pain that is sudden and persistent, or if you have pain while urinating. Do this also if you feel a lump around the testicle or notice blood in your urine or semen.  · Get help right away for uncontrolled pain or vomiting, for very red and swollen scrotum, or for fever or chills.  This information is not intended to replace advice given to you by your health care provider. Make sure you discuss any questions you have with your health care provider.  Document Released: 01/20/2012 Document Revised: 11/30/2018 Document Reviewed: 03/05/2018  [employer] Patient Education © 2020 [employer]    Diabetes Mellitus and Nutrition, Adult  When you have diabetes (diabetes mellitus), it is very important to have healthy eating habits because your blood sugar (glucose) levels are greatly affected by what you eat and drink.  Eating healthy foods in the appropriate amounts, at about the same times every day, can help you:  · Control your blood glucose.  · Lower your risk of heart disease.  · Improve your blood pressure.  · Reach or maintain a healthy weight.  Every person with diabetes is different, and each person has different needs for a meal plan. Your health care provider may recommend that you work with a diet and nutrition specialist (dietitian) to make a meal plan that is best for you. Your meal plan may vary depending on factors such as:  · The calories you need.  · The medicines you take.  · Your weight.  · Your blood glucose, blood pressure, and cholesterol levels.  · Your activity level.  · Other health conditions you have, such as heart or kidney disease.  How do carbohydrates affect me?  Carbohydrates, also called carbs, affect your blood glucose level more than any other type of food. Eating carbs naturally raises the amount of glucose in your blood. Carb counting is a method for keeping track of how many carbs you eat. Counting carbs is important to keep your blood glucose at a healthy level, especially if you use insulin or take certain oral diabetes medicines.  It is important to know how many carbs you can safely have in each meal. This is different for every person. Your dietitian can help you calculate how many carbs you should have at each meal and for each snack.  Foods that contain carbs include:  · Bread, cereal, rice, pasta, and crackers.  · Potatoes and corn.  · Peas, beans, and lentils.  · Milk and yogurt.  · Fruit and juice.  · Desserts, such as cakes, cookies, ice cream, and candy.  How does alcohol affect me?  Alcohol can cause a sudden decrease in blood glucose (hypoglycemia), especially if you use insulin or take certain oral diabetes medicines. Hypoglycemia can be a life-threatening condition. Symptoms of hypoglycemia (sleepiness, dizziness, and confusion) are similar to symptoms of having too much  "alcohol.  If your health care provider says that alcohol is safe for you, follow these guidelines:  · Limit alcohol intake to no more than 1 drink per day for nonpregnant women and 2 drinks per day for men. One drink equals 12 oz of beer, 5 oz of wine, or 1½ oz of hard liquor.  · Do not drink on an empty stomach.  · Keep yourself hydrated with water, diet soda, or unsweetened iced tea.  · Keep in mind that regular soda, juice, and other mixers may contain a lot of sugar and must be counted as carbs.  What are tips for following this plan?    Reading food labels  · Start by checking the serving size on the \"Nutrition Facts\" label of packaged foods and drinks. The amount of calories, carbs, fats, and other nutrients listed on the label is based on one serving of the item. Many items contain more than one serving per package.  · Check the total grams (g) of carbs in one serving. You can calculate the number of servings of carbs in one serving by dividing the total carbs by 15. For example, if a food has 30 g of total carbs, it would be equal to 2 servings of carbs.  · Check the number of grams (g) of saturated and trans fats in one serving. Choose foods that have low or no amount of these fats.  · Check the number of milligrams (mg) of salt (sodium) in one serving. Most people should limit total sodium intake to less than 2,300 mg per day.  · Always check the nutrition information of foods labeled as \"low-fat\" or \"nonfat\". These foods may be higher in added sugar or refined carbs and should be avoided.  · Talk to your dietitian to identify your daily goals for nutrients listed on the label.  Shopping  · Avoid buying canned, premade, or processed foods. These foods tend to be high in fat, sodium, and added sugar.  · Shop around the outside edge of the grocery store. This includes fresh fruits and vegetables, bulk grains, fresh meats, and fresh dairy.  Cooking  · Use low-heat cooking methods, such as baking, instead of " high-heat cooking methods like deep frying.  · Cook using healthy oils, such as olive, canola, or sunflower oil.  · Avoid cooking with butter, cream, or high-fat meats.  Meal planning  · Eat meals and snacks regularly, preferably at the same times every day. Avoid going long periods of time without eating.  · Eat foods high in fiber, such as fresh fruits, vegetables, beans, and whole grains. Talk to your dietitian about how many servings of carbs you can eat at each meal.  · Eat 4-6 ounces (oz) of lean protein each day, such as lean meat, chicken, fish, eggs, or tofu. One oz of lean protein is equal to:  ? 1 oz of meat, chicken, or fish.  ? 1 egg.  ? ¼ cup of tofu.  · Eat some foods each day that contain healthy fats, such as avocado, nuts, seeds, and fish.  Lifestyle  · Check your blood glucose regularly.  · Exercise regularly as told by your health care provider. This may include:  ? 150 minutes of moderate-intensity or vigorous-intensity exercise each week. This could be brisk walking, biking, or water aerobics.  ? Stretching and doing strength exercises, such as yoga or weightlifting, at least 2 times a week.  · Take medicines as told by your health care provider.  · Do not use any products that contain nicotine or tobacco, such as cigarettes and e-cigarettes. If you need help quitting, ask your health care provider.  · Work with a counselor or diabetes educator to identify strategies to manage stress and any emotional and social challenges.  Questions to ask a health care provider  · Do I need to meet with a diabetes educator?  · Do I need to meet with a dietitian?  · What number can I call if I have questions?  · When are the best times to check my blood glucose?  Where to find more information:  · American Diabetes Association: diabetes.org  · Academy of Nutrition and Dietetics: www.eatright.org  · National [place] of Diabetes and Digestive and Kidney Diseases (NIH): www.niddk.nih.gov  Summary  · A healthy  meal plan will help you control your blood glucose and maintain a healthy lifestyle.  · Working with a diet and nutrition specialist (dietitian) can help you make a meal plan that is best for you.  · Keep in mind that carbohydrates (carbs) and alcohol have immediate effects on your blood glucose levels. It is important to count carbs and to use alcohol carefully.  This information is not intended to replace advice given to you by your health care provider. Make sure you discuss any questions you have with your health care provider.  Document Released: 09/14/2006 Document Revised: 11/30/2018 Document Reviewed: 01/22/2018  [employer] Patient Education © 2020 [employer]    Discharge Instructions    Discharged to home by car with relative. Discharged via wheelchair, hospital escort: Yes.  Special equipment needed: Not Applicable    Be sure to schedule a follow-up appointment with your primary care doctor or any specialists as instructed.     Discharge Plan:   Diet Plan: Discussed  Activity Level: Discussed  Confirmed Follow up Appointment: Patient to Call and Schedule Appointment  Confirmed Symptoms Management: Discussed  Medication Reconciliation Updated: Yes  Influenza Vaccine Indication: Indicated: 9 to 64 years of age    I understand that a diet low in cholesterol, fat, and sodium is recommended for good health. Unless I have been given specific instructions below for another diet, I accept this instruction as my diet prescription.   Other diet: Low carb    Special Instructions: None    · Is patient discharged on Warfarin / Coumadin?   No     Depression / Suicide Risk    As you are discharged from this [facility] facility, it is important to learn how to keep safe from harming yourself.    Recognize the warning signs:  · Abrupt changes in personality, positive or negative- including increase in energy   · Giving away possessions  · Change in eating patterns- significant weight changes-  positive or  negative  · Change in sleeping patterns- unable to sleep or sleeping all the time   · Unwillingness or inability to communicate  · Depression  · Unusual sadness, discouragement and loneliness  · Talk of wanting to die  · Neglect of personal appearance   · Rebelliousness- reckless behavior  · Withdrawal from people/activities they love  · Confusion- inability to concentrate     If you or a loved one observes any of these behaviors or has concerns about self-harm, here's what you can do:  · Talk about it- your feelings and reasons for harming yourself  · Remove any means that you might use to hurt yourself (examples: pills, rope, extension cords, firearm)  · Get professional help from the community (Mental Health, Substance Abuse, psychological counseling)  · Do not be alone:Call your Safe Contact- someone whom you trust who will be there for you.  · Call your local CRISIS HOTLINE 990-6892 or 585-268-4067  · Call your local Children's Mobile Crisis Response Team [place] (484) 259-5433 or www.[facility]  · Call the toll free National Suicide Prevention Hotlines   · National Suicide Prevention Lifeline 018-560-EKBC (3846)  · National Hope Line Network 800-SUICIDE (140-3652)

## 2022-03-26 NOTE — CARE PLAN
The patient is Stable - Low risk of patient condition declining or worsening    Shift Goals  Clinical Goals: pain will remain controlled  Patient Goals: Rest  Family Goals: NA    Progress made toward(s) clinical / shift goals:  Patient able to communicate pain needs effectively. Medicated per Mar for pain as needed.   Problem: Pain - Standard  Goal: Alleviation of pain or a reduction in pain to the patient’s comfort goal  Outcome: Progressing     Problem: Knowledge Deficit - Standard  Goal: Patient and family/care givers will demonstrate understanding of plan of care, disease process/condition, diagnostic tests and medications  Outcome: Progressing     Problem: Urinary - Renal Perfusion  Goal: Ability to achieve and maintain adequate renal perfusion and functioning will improve  Outcome: Progressing       Patient is not progressing towards the following goals:

## 2022-03-26 NOTE — CARE PLAN
The patient is Stable - Low risk of patient condition declining or worsening    Shift Goals  Clinical Goals: Pain control 3/10  Patient Goals: Rest      Progress made toward(s) clinical / shift goals:  Patient medicated per MAR. Patient resting intermittently this shift.    Patient is not progressing towards the following goals: N/A      Problem: Pain - Standard  Goal: Alleviation of pain or a reduction in pain to the patient’s comfort goal  Outcome: Progressing     Problem: Knowledge Deficit - Standard  Goal: Patient and family/care givers will demonstrate understanding of plan of care, disease process/condition, diagnostic tests and medications  Outcome: Progressing     Problem: Hemodynamics  Goal: Patient's hemodynamics, fluid balance and neurologic status will be stable or improve  Outcome: Progressing     Problem: Fluid Volume  Goal: Fluid volume balance will be maintained  Outcome: Progressing     Problem: Urinary - Renal Perfusion  Goal: Ability to achieve and maintain adequate renal perfusion and functioning will improve  Outcome: Progressing     Problem: Respiratory  Goal: Patient will achieve/maintain optimum respiratory ventilation and gas exchange  Outcome: Progressing     Problem: Fall Risk  Goal: Patient will remain free from falls  Outcome: Progressing

## 2022-03-27 LAB
BACTERIA SPEC ANAEROBE CULT: NORMAL
BACTERIA SPEC ANAEROBE CULT: NORMAL
BACTERIA WND AEROBE CULT: ABNORMAL
BACTERIA WND AEROBE CULT: ABNORMAL
GRAM STN SPEC: ABNORMAL
SIGNIFICANT IND 70042: ABNORMAL
SIGNIFICANT IND 70042: NORMAL
SIGNIFICANT IND 70042: NORMAL
SITE SITE: ABNORMAL
SITE SITE: NORMAL
SITE SITE: NORMAL
SOURCE SOURCE: ABNORMAL
SOURCE SOURCE: NORMAL
SOURCE SOURCE: NORMAL

## 2022-03-27 NOTE — PROGRESS NOTES
Discharging patient home per MD orders. Patient verbalized understanding of discharge instructions and educational materials, as well as, follow up appointments, medications, prescriptions and home care for wound. Patient is voiding without difficulty, pain is well controlled, tolerating oral medications. O2 is greater than 90%. IV removed. All questions have been answered. Patient has been discharged off the unit with a hospital escort.

## 2022-03-28 LAB
BACTERIA BLD CULT: NORMAL
BACTERIA BLD CULT: NORMAL
GLUCOSE BLD STRIP.AUTO-MCNC: 189 MG/DL (ref 65–99)
SIGNIFICANT IND 70042: NORMAL
SIGNIFICANT IND 70042: NORMAL
SITE SITE: NORMAL
SITE SITE: NORMAL
SOURCE SOURCE: NORMAL
SOURCE SOURCE: NORMAL

## 2022-03-28 NOTE — OP REPORT
DATE OF SERVICE:  03/24/2022     PREOPERATIVE DIAGNOSES:  1.  Left scrotal abscess.  2.  Bilateral large hydroceles.     OPERATIONS AND PROCEDURES PERFORMED:  1.  Exam under anesthesia.  2.  Left scrotal exploration.  3.  Incision and drainage of left scrotal abscess, 3 cm x 3 cm by depth of 1.5   cm.     SURGEON:  Jonathan Osorio MD     ANESTHESIA:  General endotracheal.     ANESTHESIOLOGIST:  Benjamin Elena MD     POSTOPERATIVE DIAGNOSES:  1.  Left scrotal abscess.  2.  Bilateral large hydroceles.     SPECIMENS:  A.  Left scrotal abscess fluid for aerobic and anaerobic cultures.  B.  Left scrotal tissue to pathology for permanent section.     ESTIMATED BLOOD LOSS:  50 mL     INDICATIONS:  The patient is a 32-year-old male with a several week history of   enlarging left and right hemiscrotum.  He has had prior inguinal   herniorrhaphies.  He is morbidly obese and has a 1.5 cm x 1.5 cm area of skin   breakdown with some necrotic tissue inferiorly and superiorly, mild   fluctuance. A CAT scan was performed.  There is no subcutaneous area to   suggest deeper involvement, but this certainly is concerning for a minimum   abscess and I recommended to the patient hospital admission, which was   performed, broad-spectrum antibiotics and exploration in the operating room.    Prior to surgery, I discussed the risk of the procedure including but not   limited to risk of perioperative worsening of the infection requiring   secondary procedures.  Potential risk of prolonged hospitalization due to the   severity of the infection.  He is also aware of the risk of postoperative   pain, bleeding, and the perioperative risk of deep vein thrombosis, pulmonary   embolism, aspiration pneumonia and death.  Informed consent was given to me by   the patient to proceed.     DESCRIPTION OF PROCEDURE:  After informed consent was obtained, the patient   was brought to the operating room and placed supine.  A general endotracheal   anesthetic  administered in balanced fashion by Benjamin Elena.  The patient is   on broad-spectrum antibiotics.  He was positioned in modified lithotomy where   the operative area was Betadine prepped and draped in usual sterile fashion.    A surgical timeout was called.  All members of the operative team agree as the   patient's name, procedure to be performed without objections, attention was   directed to the procedure.     I began the procedure by doing an exam under anesthesia.  He has significant   bilateral scrotal swelling.  He has erythema overlying the left hemiscrotum.    The testicles are tense due to large hydroceles bilaterally as well as   cellulitis on the left side. There is an approximately 1.5 cm area of necrotic   tissue on the left lateral scrotum and some fluctuance above this.  There   does not appear to be a deep abscess.  As such, with loupe magnification,   attention was directed to the procedure.     I began the procedure after performing exam by making an elliptical incision   and excision of the tissue.  Once I entered the tissue, I was able to do an   aerobic and anaerobic culture, which was submitted for microbiology.  I   excised a 3 cm x 3 cm superficial abscess in elliptical fashion and then   debrided to a depth of 1.5 cm.  This did not appear to involve the underlying   hydrocele.  There was no communication of infection, so I left the wound as a   superficial wound.  I copiously irrigated this, hemostasis was obtained with   electrocautery.  At the completion, I placed a Betadine-soaked Kerlix into the   base of the wound and then a fluff dressing was applied with an athletic   supporter.  At the end of the case, sponge, instrument and needle counts were   correct x2.  He had tolerated the procedure well without complication, was   awakened in the operating room and transferred to recovery room where he   arrived in stable condition.        ______________________________  Jonathan Osorio  MD GALAVIZ/CIARAN/DAVIDA    DD:  03/28/2022 13:33  DT:  03/28/2022 14:03    Job#:  [id number]

## (undated) DEVICE — CANNULA W/SEAL 5X100 Z-THRE - ADED KII (12/BX)

## (undated) DEVICE — BAG RETRIEVAL 12/15 MM INZII (5EA/CA) THIS WILL REPLACE ITEM 75018

## (undated) DEVICE — ELECTRODE 850 FOAM ADHESIVE - HYDROGEL RADIOTRNSPRNT (50/PK)

## (undated) DEVICE — TUBE E-T HI-LO CUFF 8.5MM (10EA/PK)

## (undated) DEVICE — SUTURE 0 LIGATING REEL VICRYL PLUS (12PK/BX)

## (undated) DEVICE — GOWN WARMING STANDARD FLEX - (30/CA)

## (undated) DEVICE — SODIUM CHL IRRIGATION 0.9% 1000ML (12EA/CA)

## (undated) DEVICE — GLOVE BIOGEL PI INDICATOR SZ 7.0 SURGICAL PF LF - (50/BX 4BX/CA)

## (undated) DEVICE — RELOAD WITH GRIPPING SURFACE TECHNOLOGY BLUE 60MM (12EA/BX)

## (undated) DEVICE — HEAD HOLDER JUNIOR/ADULT

## (undated) DEVICE — TROCAR 5X100 NON BLADED Z-TH - READ KII (6/BX)

## (undated) DEVICE — NEEDLE MONOPTY 14GAX16CM - (10EA/CA)

## (undated) DEVICE — SYSTEM CALIBARATION  GASTRECTOMY 40FR (5/BX)

## (undated) DEVICE — MASK ANESTHESIA ADULT  - (100/CA)

## (undated) DEVICE — TOWEL STOP TIMEOUT SAFETY FLAG (40EA/CA)

## (undated) DEVICE — BLADE SURGICAL #15 - (50/BX 3BX/CA)

## (undated) DEVICE — KIT ANESTHESIA W/CIRCUIT & 3/LT BAG W/FILTER (20EA/CA)

## (undated) DEVICE — PACK GASTRIC BANDING OR - (1/CA)

## (undated) DEVICE — CHLORAPREP 26 ML APPLICATOR - ORANGE TINT(25/CA)

## (undated) DEVICE — PROTECTOR ULNA NERVE - (36PR/CA)

## (undated) DEVICE — SCISSORS 5MM CVD (6EA/BX)

## (undated) DEVICE — RELOAD WITH GRIPPING SURGACE TECHNOLOGY GREEN 60MM (12EA/BX)

## (undated) DEVICE — SENSOR SPO2 NEO LNCS ADHESIVE (20/BX) SEE USER NOTES

## (undated) DEVICE — GLOVE SZ 6.5 BIOGEL PI MICRO - PF LF (50PR/BX)

## (undated) DEVICE — NEPTUNE 4 PORT MANIFOLD - (20/PK)

## (undated) DEVICE — GAUZE FLUFF STERILE 2-PLY 36 X 36 (100EA/CA)

## (undated) DEVICE — TROCAR 12 X 150 KII FIOS Z THREAD (6EA/BX)

## (undated) DEVICE — LACTATED RINGERS INJ 1000 ML - (14EA/CA 60CA/PF)

## (undated) DEVICE — SLEEVE, VASO, THIGH, MED

## (undated) DEVICE — PACK MINOR BASIN - (2EA/CA)

## (undated) DEVICE — SET EXTENSION WITH 2 PORTS (48EA/CA) ***PART #2C8610 IS A SUBSTITUTE*****

## (undated) DEVICE — GLOVE BIOGEL SZ 7 SURGICAL PF LTX - (50PR/BX 4BX/CA)

## (undated) DEVICE — WATER IRRIG. STER. 1500 ML - (9/CA)

## (undated) DEVICE — PERISTRIP 60 STAPLE LINE REINFORCEMENT (6EA/CA)

## (undated) DEVICE — ELECTRODE 5MM LHK LAPSCP STERILE DISP- MEGADYNE  (5/CA)

## (undated) DEVICE — KIT ROOM DECONTAMINATION

## (undated) DEVICE — CANISTER SUCTION 3000ML MECHANICAL FILTER AUTO SHUTOFF MEDI-VAC NONSTERILE LF DISP  (40EA/CA)

## (undated) DEVICE — CLIP APPLIER 10MM ENDO LARGE (3EA/BX)

## (undated) DEVICE — TUBING CLEARLINK DUO-VENT - C-FLO (48EA/CA)

## (undated) DEVICE — SUCTION INSTRUMENT YANKAUER BULBOUS TIP W/O VENT (50EA/CA)

## (undated) DEVICE — SEALER ARTICULATING TISSUE NSEAL (6/BX)

## (undated) DEVICE — TISSEEL 4ML ----MUST ORDER A MIN OF 6EA----

## (undated) DEVICE — TROCAR SEPARATOR 15MMZTHREAD - (6/BX)

## (undated) DEVICE — DRESSING NON ADHERENT 3 X 4 - STERILE (100/BX 12BX/CA)

## (undated) DEVICE — STAPLER POWERED 60MM (3EA/BX)

## (undated) DEVICE — GLOVE BIOGEL PI INDICATOR SZ 7.5 SURGICAL PF LF -(50/BX 4BX/CA)

## (undated) DEVICE — GLOVE, LITE (PAIR)

## (undated) DEVICE — GLOVE SZ 7 BIOGEL PI MICRO - PF LF (50PR/BX 4BX/CA)

## (undated) DEVICE — RELOAD WITH GRIPPING SURFACE TECHNOLOGY GOLD 60MM (12EA/BX)

## (undated) DEVICE — SUTURE 4-0 VICRYL PLUSFS-1 - 27 INCH (36/BX)

## (undated) DEVICE — SET LEADWIRE 5 LEAD BEDSIDE DISPOSABLE ECG (1SET OF 5/EA)

## (undated) DEVICE — SUTURE GENERAL

## (undated) DEVICE — APPLICATOR DUPLO SPRAYER (5EA/CA)

## (undated) DEVICE — ELECTRODE DUAL RETURN W/ CORD - (50/PK)